# Patient Record
Sex: MALE | Race: WHITE | NOT HISPANIC OR LATINO | Employment: FULL TIME | URBAN - METROPOLITAN AREA
[De-identification: names, ages, dates, MRNs, and addresses within clinical notes are randomized per-mention and may not be internally consistent; named-entity substitution may affect disease eponyms.]

---

## 2017-09-22 ENCOUNTER — TRANSCRIBE ORDERS (OUTPATIENT)
Dept: ADMINISTRATIVE | Facility: HOSPITAL | Age: 47
End: 2017-09-22

## 2017-09-22 DIAGNOSIS — M25.662 DECREASED ROM OF LEFT KNEE: Primary | ICD-10-CM

## 2017-09-22 DIAGNOSIS — M25.362 INSTABILITY OF KNEE JOINT, LEFT: ICD-10-CM

## 2017-09-22 DIAGNOSIS — M25.562 LEFT KNEE PAIN, UNSPECIFIED CHRONICITY: ICD-10-CM

## 2017-09-28 ENCOUNTER — HOSPITAL ENCOUNTER (OUTPATIENT)
Dept: MRI IMAGING | Facility: HOSPITAL | Age: 47
Discharge: HOME | End: 2017-09-28
Payer: COMMERCIAL

## 2017-09-28 DIAGNOSIS — M25.362 INSTABILITY OF KNEE JOINT, LEFT: ICD-10-CM

## 2017-09-28 DIAGNOSIS — M25.662 DECREASED ROM OF LEFT KNEE: ICD-10-CM

## 2017-09-28 DIAGNOSIS — M25.562 LEFT KNEE PAIN, UNSPECIFIED CHRONICITY: ICD-10-CM

## 2017-09-28 PROCEDURE — 73721 MRI JNT OF LWR EXTRE W/O DYE: CPT

## 2018-03-20 DIAGNOSIS — Z00.00 WELL ADULT EXAM: Primary | ICD-10-CM

## 2018-03-29 ENCOUNTER — HOSPITAL ENCOUNTER (OUTPATIENT)
Dept: CT IMAGING | Facility: HOSPITAL | Age: 48
Discharge: HOME | End: 2018-03-29

## 2018-03-29 ENCOUNTER — HOSPITAL ENCOUNTER (OUTPATIENT)
Dept: ULTRASOUND IMAGING | Facility: HOSPITAL | Age: 48
Discharge: HOME | End: 2018-03-29

## 2018-03-29 ENCOUNTER — HOSPITAL ENCOUNTER (OUTPATIENT)
Dept: NON INVASIVE DIAGNOSTICS | Facility: CLINIC | Age: 48
Discharge: HOME | End: 2018-03-29

## 2018-03-29 ENCOUNTER — OFFICE VISIT (OUTPATIENT)
Dept: FAMILY MEDICINE CLINIC | Facility: CLINIC | Age: 48
End: 2018-03-29

## 2018-03-29 ENCOUNTER — APPOINTMENT (OUTPATIENT)
Dept: LAB | Facility: CLINIC | Age: 48
End: 2018-03-29

## 2018-03-29 VITALS
TEMPERATURE: 98.7 F | HEART RATE: 76 BPM | DIASTOLIC BLOOD PRESSURE: 82 MMHG | BODY MASS INDEX: 28.06 KG/M2 | RESPIRATION RATE: 12 BRPM | WEIGHT: 207.2 LBS | SYSTOLIC BLOOD PRESSURE: 132 MMHG | HEIGHT: 72 IN

## 2018-03-29 DIAGNOSIS — Z00.00 WELL ADULT EXAM: ICD-10-CM

## 2018-03-29 DIAGNOSIS — Z00.00 WELL ADULT EXAM: Primary | ICD-10-CM

## 2018-03-29 DIAGNOSIS — E55.9 VITAMIN D DEFICIENCY: ICD-10-CM

## 2018-03-29 DIAGNOSIS — R31.29 MICROSCOPIC HEMATURIA: ICD-10-CM

## 2018-03-29 DIAGNOSIS — E78.01 FAMILIAL HYPERCHOLESTEROLEMIA: ICD-10-CM

## 2018-03-29 DIAGNOSIS — R73.09 ELEVATED GLUCOSE: ICD-10-CM

## 2018-03-29 LAB
25(OH)D3 SERPL-MCNC: 14.6 NG/ML (ref 30–100)
ALBUMIN SERPL BCP-MCNC: 4.3 G/DL (ref 3.5–5)
ALP SERPL-CCNC: 47 U/L (ref 46–116)
ALT SERPL W P-5'-P-CCNC: 28 U/L (ref 12–78)
ANION GAP SERPL CALCULATED.3IONS-SCNC: 8 MMOL/L (ref 4–13)
AST SERPL W P-5'-P-CCNC: 14 U/L (ref 5–45)
ATRIAL RATE: 64 BPM
BACTERIA UR QL AUTO: ABNORMAL /HPF
BASOPHILS # BLD AUTO: 0.03 THOUSANDS/ΜL (ref 0–0.1)
BASOPHILS NFR BLD AUTO: 1 % (ref 0–1)
BILIRUB SERPL-MCNC: 0.8 MG/DL (ref 0.2–1)
BILIRUB UR QL STRIP: NEGATIVE
BUN SERPL-MCNC: 17 MG/DL (ref 5–25)
CALCIUM SERPL-MCNC: 8.7 MG/DL (ref 8.3–10.1)
CHEST PAIN STATEMENT: NORMAL
CHLORIDE SERPL-SCNC: 105 MMOL/L (ref 100–108)
CHOLEST SERPL-MCNC: 221 MG/DL (ref 50–200)
CLARITY UR: CLEAR
CO2 SERPL-SCNC: 30 MMOL/L (ref 21–32)
COLOR UR: YELLOW
CREAT SERPL-MCNC: 1.06 MG/DL (ref 0.6–1.3)
CRP SERPL HS-MCNC: 1.69 MG/L
EOSINOPHIL # BLD AUTO: 0.08 THOUSAND/ΜL (ref 0–0.61)
EOSINOPHIL NFR BLD AUTO: 2 % (ref 0–6)
ERYTHROCYTE [DISTWIDTH] IN BLOOD BY AUTOMATED COUNT: 13.1 % (ref 11.6–15.1)
EST. AVERAGE GLUCOSE BLD GHB EST-MCNC: 131 MG/DL
GFR SERPL CREATININE-BSD FRML MDRD: 84 ML/MIN/1.73SQ M
GLUCOSE P FAST SERPL-MCNC: 106 MG/DL (ref 65–99)
GLUCOSE UR STRIP-MCNC: NEGATIVE MG/DL
HBA1C MFR BLD: 6.2 % (ref 4.2–6.3)
HCT VFR BLD AUTO: 44.7 % (ref 36.5–49.3)
HDLC SERPL-MCNC: 39 MG/DL (ref 40–60)
HGB BLD-MCNC: 14.8 G/DL (ref 12–17)
HGB UR QL STRIP.AUTO: ABNORMAL
KETONES UR STRIP-MCNC: NEGATIVE MG/DL
LDLC SERPL CALC-MCNC: 152 MG/DL (ref 0–100)
LEUKOCYTE ESTERASE UR QL STRIP: NEGATIVE
LYMPHOCYTES # BLD AUTO: 2.05 THOUSANDS/ΜL (ref 0.6–4.47)
LYMPHOCYTES NFR BLD AUTO: 41 % (ref 14–44)
MAX DIASTOLIC BP: 94 MMHG
MAX HEART RATE: 179 BPM
MAX PREDICTED HEART RATE: 175 BPM
MAX. SYSTOLIC BP: 196 MMHG
MCH RBC QN AUTO: 29.2 PG (ref 26.8–34.3)
MCHC RBC AUTO-ENTMCNC: 33.1 G/DL (ref 31.4–37.4)
MCV RBC AUTO: 88 FL (ref 82–98)
MONOCYTES # BLD AUTO: 0.33 THOUSAND/ΜL (ref 0.17–1.22)
MONOCYTES NFR BLD AUTO: 7 % (ref 4–12)
MUCOUS THREADS UR QL AUTO: ABNORMAL
NEUTROPHILS # BLD AUTO: 2.56 THOUSANDS/ΜL (ref 1.85–7.62)
NEUTS SEG NFR BLD AUTO: 49 % (ref 43–75)
NITRITE UR QL STRIP: NEGATIVE
NON-SQ EPI CELLS URNS QL MICRO: ABNORMAL /HPF
P AXIS: 17 DEGREES
PH UR STRIP.AUTO: 5 [PH] (ref 4.5–8)
PLATELET # BLD AUTO: 222 THOUSANDS/UL (ref 149–390)
PMV BLD AUTO: 10.8 FL (ref 8.9–12.7)
POTASSIUM SERPL-SCNC: 4.4 MMOL/L (ref 3.5–5.3)
PR INTERVAL: 156 MS
PROT SERPL-MCNC: 7.2 G/DL (ref 6.4–8.2)
PROT UR STRIP-MCNC: NEGATIVE MG/DL
PROTOCOL NAME: NORMAL
PSA SERPL-MCNC: 0.9 NG/ML (ref 0–4)
QRS AXIS: 8 DEGREES
QRSD INTERVAL: 84 MS
QT INTERVAL: 408 MS
QTC INTERVAL: 420 MS
RBC # BLD AUTO: 5.06 MILLION/UL (ref 3.88–5.62)
RBC #/AREA URNS AUTO: ABNORMAL /HPF
REASON FOR TERMINATION: NORMAL
SODIUM SERPL-SCNC: 143 MMOL/L (ref 136–145)
SP GR UR STRIP.AUTO: >=1.03 (ref 1–1.03)
T WAVE AXIS: 8 DEGREES
TARGET HR FORMULA: NORMAL
TEST INDICATION: NORMAL
TIME IN EXERCISE PHASE: NORMAL
TRIGL SERPL-MCNC: 149 MG/DL
TSH SERPL DL<=0.05 MIU/L-ACNC: 1.16 UIU/ML (ref 0.36–3.74)
UROBILINOGEN UR QL STRIP.AUTO: 0.2 E.U./DL
VENTRICULAR RATE: 64 BPM
WBC # BLD AUTO: 5.05 THOUSAND/UL (ref 4.31–10.16)
WBC #/AREA URNS AUTO: ABNORMAL /HPF

## 2018-03-29 PROCEDURE — 93010 ELECTROCARDIOGRAM REPORT: CPT | Performed by: INTERNAL MEDICINE

## 2018-03-29 PROCEDURE — 93306 TTE W/DOPPLER COMPLETE: CPT | Performed by: INTERNAL MEDICINE

## 2018-03-29 PROCEDURE — 82306 VITAMIN D 25 HYDROXY: CPT

## 2018-03-29 PROCEDURE — 75571 CT HRT W/O DYE W/CA TEST: CPT

## 2018-03-29 PROCEDURE — 81001 URINALYSIS AUTO W/SCOPE: CPT

## 2018-03-29 PROCEDURE — 93350 STRESS TTE ONLY: CPT

## 2018-03-29 PROCEDURE — 36415 COLL VENOUS BLD VENIPUNCTURE: CPT

## 2018-03-29 PROCEDURE — 76700 US EXAM ABDOM COMPLETE: CPT

## 2018-03-29 PROCEDURE — 93979 VASCULAR STUDY: CPT | Performed by: SURGERY

## 2018-03-29 PROCEDURE — 93351 STRESS TTE COMPLETE: CPT | Performed by: INTERNAL MEDICINE

## 2018-03-29 PROCEDURE — 83036 HEMOGLOBIN GLYCOSYLATED A1C: CPT

## 2018-03-29 PROCEDURE — 80061 LIPID PANEL: CPT

## 2018-03-29 PROCEDURE — 84443 ASSAY THYROID STIM HORMONE: CPT

## 2018-03-29 PROCEDURE — 86141 C-REACTIVE PROTEIN HS: CPT

## 2018-03-29 PROCEDURE — 93306 TTE W/DOPPLER COMPLETE: CPT

## 2018-03-29 PROCEDURE — 93922 UPR/L XTREMITY ART 2 LEVELS: CPT | Performed by: SURGERY

## 2018-03-29 PROCEDURE — 84153 ASSAY OF PSA TOTAL: CPT

## 2018-03-29 PROCEDURE — 85025 COMPLETE CBC W/AUTO DIFF WBC: CPT

## 2018-03-29 PROCEDURE — 80053 COMPREHEN METABOLIC PANEL: CPT

## 2018-03-29 PROCEDURE — 93880 EXTRACRANIAL BILAT STUDY: CPT | Performed by: SURGERY

## 2018-03-29 PROCEDURE — 93005 ELECTROCARDIOGRAM TRACING: CPT

## 2018-03-29 PROCEDURE — 99499EX: Performed by: INTERNAL MEDICINE

## 2018-03-29 NOTE — PROGRESS NOTES
HEART AND VASCULAR SUMMARY      1  ECG: Normal Sinus Rhythm  Normal ECG  2  Echocardiogram: Normal LV function  No valvular disease    3  Stress ECHO: Normal ECG response to exercise  4  Coronary Calcium CT: 0      Impression and Recommendations:    No evidence of coronary artery disease  Recommend heart healthy lifestyle

## 2018-03-29 NOTE — PATIENT INSTRUCTIONS
Lynette Arellano, please take over-the-counter vitamin D3 2000 units per day for vitamin-D deficiency

## 2018-03-29 NOTE — PROGRESS NOTES
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, and softball as well  He is generally active  He had a recent problem with mild left knee pain after jumping off his truck at the beginning of this year, with MRI performed showing a bone chip  No treatment was required and he has no symptoms after some initial swelling  There have been no previous surgeries or serious illnesses  HPI his healthcare goals are to maintain exercise 3 to 4 times per week, which she currently performs without chest pain or shortness of breath or declining exercise tolerance  He would like to strengthen his core body muscles  He would also like to reduce his weight by about 10 lb  His [employer] concerns are his stress at work and counter acting this, and his family history which includes his father having high blood pressure and diabetes, and his mother surviving breast cancer 25 years ago  He also has 1 sibling with high blood pressure  He has had 1 previous executive physical and reports that this was unremarkable  He has no known history of elevated blood sugar, diabetes, diagnosis of hypertension or hyperlipidemia  Past medical history is as above and also history of Osgood-Schlatter's disease as a teenager and he did wear knee braces when playing sports  Family history:  Please see above  Social history:  He drinks alcohol socially, and infrequently smokes a cigar  He does exercise regularly  He is , and works full-time  There is no history of any drug use      Allergies:  No known allergies to medicines  Medications:  None  Review of Systems  as above, and as per the healthcare questionnaire, which is essentially negative, all others negative  Active Ambulatory Problems     Diagnosis Date Noted    No Active Ambulatory Problems     Resolved Ambulatory Problems     Diagnosis Date Noted    No Resolved Ambulatory Problems     No Additional Past Medical History       No past surgical history on file  No family history on file  Social History     Social History    Marital status: /Civil Union     Spouse name: N/A    Number of children: N/A    Years of education: N/A     Occupational History    Not on file  Social History Main Topics    Smoking status: Not on file    Smokeless tobacco: Not on file    Alcohol use Not on file    Drug use: Unknown    Sexual activity: Not on file     Other Topics Concern    Not on file     Social History Narrative    No narrative on file       No current outpatient prescriptions on file  Allergies not on file    No exam data present    Objective:     Physical Exam      Vital signs stable, with blood pressure 132/82, left arm sitting, pulse regular at 76, respirations unlabored at 12 and temperature is 98 7°  He is 6 feet tall and 207 2 lb  Visual acuity in the right eye is 20/30, and is 20/20 in the right eye without glasses or contact lenses  HEENT exam:  Unremarkable including grossly normal hearing and vision, with 3 mm pupils, with normal direct and consensual light responses, extraocular eye movements intact with normal funduscopic exam   There is no scleral icterus or conjunctival pallor  Dentition is normal without gum disease  Pharynx is normal   There is no head or neck mass or adenopathy      Neck:  Without trauma, full range of motion, supple, no JVD, no carotid bruits and carotid pulses are normal   Trachea midline without stridor and thyroid exam is normal     Chest:  No deformity, no trauma, no supraclavicular adenopathy  Lungs clear throughout, with normal expiratory phase as well  Cardiac:  Regular rate and rhythm, normal S1 and S2, no murmur, no S4 or S3, PMI fifth intercostal space midclavicular line  Back:  No CVA mass or tenderness, no kyphosis or scoliosis  There is full range of motion of the spine  Abdomen:  No surgical scars, no periumbilical adenopathy or hernia throughout, nondistended, normal bowel sounds, soft and nontender without masses bruits organomegaly  There is also no inguinal hernia, there are 2/2 femoral pulses and there is no inguinal adenopathy  Extremities: There is no edema clubbing or cyanosis  There are 2/2 radial, brachial, popliteal, posterior tibial and dorsalis pedis pulses bilaterally  Exam of feet is notable for a posttraumatic second left toenail with partial nail regrowth  Neurologic exam is normal, including normal cognitive status, normal affect  Joints notable for good joint function, with evidence of previous Osgood-Schlatter's disease  Skin:  Very little some related skin damage, with rare benign hemangiomas, rare slightly darkly pigmented nevi, slight hyper trick [place] of the trunk, no rash or skin malignancy is noted  Assessment/Plan:    There are no diagnoses linked to this encounter  Executive Physical Summary:      Sean Del Valle, please take Vitamin D3 2000 units per day  Please repeat your urinalysis in one month  Please recheck your HbA1c yearly and limit your carbohydrate intake and exercise regularly

## 2018-03-29 NOTE — PROGRESS NOTES
Fitness Summary and Recommendations:  Zan Munoz scored at the 25% on his body composition assessment with a bodyfat % of 24 7%  Zan Munoz scored in the average range for flexibility with a sit & reach score of 18 cm  His Muscle Strength/Endurance scores placed him at the 35% (lower body) and 90% (upper body) with a chair stand score of 22 and arm curl score of 30 respectfully  [facility] Cardiovascular Score of 79 1 placed him at the 95%  Overall Zan Munoz would be considered to have an above average fitness level with a 55% score  Continued emphasis on regular exercise and sound nutrition practices will enable Zan Munoz to maintain his optimal level of fitness

## 2018-03-29 NOTE — PROGRESS NOTES
Nutritional Summary and Recommendations:   Chely Ramirez presents for nutrition assessment and education  Discussion focused on restoration of Vit D levels to normal, improved body composition and HDL levels, and current sub optimal protein intake in relation to skipping meals and snacks  HT: 72", WT: 204 8 lb, BMI 27 8  Fat Mass 50 6 lb, Fat % 24 7%,  2 lb  Desired Fat Mass 43 lb  BMR 1959 kcal   Meds: reviewed  Labs: Vit D 14 6, Chol 221, HDL 39, , Trig 149, Glu 106  Nutrition Diagnoses: Overweight and abnormal nutrition related labs related to food and nutrition knowledge deficit as evidenced by body composition results, lab values and patient food recall  Goals:  1  Consume 2100 kcal, =/> 97 gram protein daily  2  Food journal with myfitnesspal   3  Achieve fat mass 43 lb within 12 months    4  Restore Vitamin D to >30 within 6 months with compliance of Dr  Prescribed Vitamin D3   5  Consume lean protein sources at all meals and 1-2 snacks daily to achieve 97 gram protein goal   6  Initiate consuming breakfast within 1 hour upon waking to include :       Lean protein(atleast 15 grms protein)  and        Complex carbohydrate(fresh fruit or a grain with >3grm fiber/serving)   Perceived Comprehension: Very good                   Expected Compliance: Very Good

## 2019-07-23 DIAGNOSIS — Z00.00 HEALTH CARE MAINTENANCE: Primary | ICD-10-CM

## 2019-07-23 PROCEDURE — 99499EX: Performed by: FAMILY MEDICINE

## 2019-08-06 ENCOUNTER — HOSPITAL ENCOUNTER (OUTPATIENT)
Dept: NON INVASIVE DIAGNOSTICS | Facility: CLINIC | Age: 49
Discharge: HOME | End: 2019-08-06

## 2019-08-06 ENCOUNTER — APPOINTMENT (OUTPATIENT)
Dept: LAB | Facility: CLINIC | Age: 49
End: 2019-08-06

## 2019-08-06 ENCOUNTER — OFFICE VISIT (OUTPATIENT)
Dept: FAMILY MEDICINE CLINIC | Facility: CLINIC | Age: 49
End: 2019-08-06

## 2019-08-06 ENCOUNTER — OFFICE VISIT (OUTPATIENT)
Dept: LAB | Facility: CLINIC | Age: 49
End: 2019-08-06

## 2019-08-06 VITALS
OXYGEN SATURATION: 100 % | RESPIRATION RATE: 10 BRPM | SYSTOLIC BLOOD PRESSURE: 122 MMHG | DIASTOLIC BLOOD PRESSURE: 88 MMHG | WEIGHT: 196.8 LBS | BODY MASS INDEX: 26.66 KG/M2 | HEIGHT: 72 IN | HEART RATE: 56 BPM | TEMPERATURE: 97.8 F

## 2019-08-06 DIAGNOSIS — Z00.00 HEALTH CARE MAINTENANCE: ICD-10-CM

## 2019-08-06 DIAGNOSIS — Z00.00 HEALTH CARE MAINTENANCE: Primary | ICD-10-CM

## 2019-08-06 DIAGNOSIS — R31.29 MICROSCOPIC HEMATURIA: ICD-10-CM

## 2019-08-06 DIAGNOSIS — E78.2 MIXED HYPERLIPIDEMIA: ICD-10-CM

## 2019-08-06 DIAGNOSIS — R73.09 ELEVATED GLUCOSE: ICD-10-CM

## 2019-08-06 PROBLEM — I10 ESSENTIAL HYPERTENSION: Status: ACTIVE | Noted: 2019-08-06

## 2019-08-06 LAB
25(OH)D3 SERPL-MCNC: 26.1 NG/ML (ref 30–100)
ALBUMIN SERPL BCP-MCNC: 4.3 G/DL (ref 3.5–5)
ALP SERPL-CCNC: 54 U/L (ref 46–116)
ALT SERPL W P-5'-P-CCNC: 29 U/L (ref 12–78)
ANION GAP SERPL CALCULATED.3IONS-SCNC: 8 MMOL/L (ref 4–13)
AST SERPL W P-5'-P-CCNC: 14 U/L (ref 5–45)
ATRIAL RATE: 64 BPM
BACTERIA UR QL AUTO: ABNORMAL /HPF
BASOPHILS # BLD AUTO: 0.03 THOUSANDS/ΜL (ref 0–0.1)
BASOPHILS NFR BLD AUTO: 1 % (ref 0–1)
BILIRUB SERPL-MCNC: 1 MG/DL (ref 0.2–1)
BILIRUB UR QL STRIP: NEGATIVE
BUN SERPL-MCNC: 22 MG/DL (ref 5–25)
CALCIUM SERPL-MCNC: 8.9 MG/DL (ref 8.3–10.1)
CHEST PAIN STATEMENT: NORMAL
CHLORIDE SERPL-SCNC: 103 MMOL/L (ref 100–108)
CHOLEST SERPL-MCNC: 235 MG/DL (ref 50–200)
CLARITY UR: CLEAR
CO2 SERPL-SCNC: 29 MMOL/L (ref 21–32)
COLOR UR: YELLOW
CREAT SERPL-MCNC: 1.17 MG/DL (ref 0.6–1.3)
CRP SERPL HS-MCNC: 3.04 MG/L
EOSINOPHIL # BLD AUTO: 0.06 THOUSAND/ΜL (ref 0–0.61)
EOSINOPHIL NFR BLD AUTO: 1 % (ref 0–6)
ERYTHROCYTE [DISTWIDTH] IN BLOOD BY AUTOMATED COUNT: 13.1 % (ref 11.6–15.1)
EST. AVERAGE GLUCOSE BLD GHB EST-MCNC: 117 MG/DL
GFR SERPL CREATININE-BSD FRML MDRD: 74 ML/MIN/1.73SQ M
GLUCOSE P FAST SERPL-MCNC: 96 MG/DL (ref 65–99)
GLUCOSE UR STRIP-MCNC: NEGATIVE MG/DL
HBA1C MFR BLD: 5.7 % (ref 4.2–6.3)
HCT VFR BLD AUTO: 47.1 % (ref 36.5–49.3)
HDLC SERPL-MCNC: 42 MG/DL (ref 40–60)
HGB BLD-MCNC: 15.3 G/DL (ref 12–17)
HGB UR QL STRIP.AUTO: ABNORMAL
IMM GRANULOCYTES # BLD AUTO: 0.01 THOUSAND/UL (ref 0–0.2)
IMM GRANULOCYTES NFR BLD AUTO: 0 % (ref 0–2)
KETONES UR STRIP-MCNC: NEGATIVE MG/DL
LDLC SERPL CALC-MCNC: 164 MG/DL (ref 0–100)
LEUKOCYTE ESTERASE UR QL STRIP: NEGATIVE
LYMPHOCYTES # BLD AUTO: 1.69 THOUSANDS/ΜL (ref 0.6–4.47)
LYMPHOCYTES NFR BLD AUTO: 34 % (ref 14–44)
MAX DIASTOLIC BP: 90 MMHG
MAX HEART RATE: 171 BPM
MAX PREDICTED HEART RATE: 174 BPM
MAX. SYSTOLIC BP: 198 MMHG
MCH RBC QN AUTO: 30.4 PG (ref 26.8–34.3)
MCHC RBC AUTO-ENTMCNC: 32.5 G/DL (ref 31.4–37.4)
MCV RBC AUTO: 94 FL (ref 82–98)
MONOCYTES # BLD AUTO: 0.48 THOUSAND/ΜL (ref 0.17–1.22)
MONOCYTES NFR BLD AUTO: 10 % (ref 4–12)
NEUTROPHILS # BLD AUTO: 2.77 THOUSANDS/ΜL (ref 1.85–7.62)
NEUTS SEG NFR BLD AUTO: 54 % (ref 43–75)
NITRITE UR QL STRIP: NEGATIVE
NON-SQ EPI CELLS URNS QL MICRO: ABNORMAL /HPF
NRBC BLD AUTO-RTO: 0 /100 WBCS
P AXIS: 16 DEGREES
PH UR STRIP.AUTO: 5.5 [PH]
PLATELET # BLD AUTO: 193 THOUSANDS/UL (ref 149–390)
PMV BLD AUTO: 10.9 FL (ref 8.9–12.7)
POTASSIUM SERPL-SCNC: 4.6 MMOL/L (ref 3.5–5.3)
PR INTERVAL: 156 MS
PROT SERPL-MCNC: 7.6 G/DL (ref 6.4–8.2)
PROT UR STRIP-MCNC: NEGATIVE MG/DL
PROTOCOL NAME: NORMAL
PSA SERPL-MCNC: 0.9 NG/ML (ref 0–4)
QRS AXIS: 16 DEGREES
QRSD INTERVAL: 86 MS
QT INTERVAL: 430 MS
QTC INTERVAL: 443 MS
RBC # BLD AUTO: 5.04 MILLION/UL (ref 3.88–5.62)
RBC #/AREA URNS AUTO: ABNORMAL /HPF
REASON FOR TERMINATION: NORMAL
SODIUM SERPL-SCNC: 140 MMOL/L (ref 136–145)
SP GR UR STRIP.AUTO: 1.01 (ref 1–1.03)
T WAVE AXIS: 13 DEGREES
TARGET HR FORMULA: NORMAL
TEST INDICATION: NORMAL
TIME IN EXERCISE PHASE: NORMAL
TRIGL SERPL-MCNC: 145 MG/DL
TSH SERPL DL<=0.05 MIU/L-ACNC: 1.74 UIU/ML (ref 0.36–3.74)
UROBILINOGEN UR QL STRIP.AUTO: 0.2 E.U./DL
VENTRICULAR RATE: 64 BPM
WBC # BLD AUTO: 5.04 THOUSAND/UL (ref 4.31–10.16)
WBC #/AREA URNS AUTO: ABNORMAL /HPF

## 2019-08-06 PROCEDURE — 83036 HEMOGLOBIN GLYCOSYLATED A1C: CPT

## 2019-08-06 PROCEDURE — 93005 ELECTROCARDIOGRAM TRACING: CPT

## 2019-08-06 PROCEDURE — 36415 COLL VENOUS BLD VENIPUNCTURE: CPT

## 2019-08-06 PROCEDURE — 93306 TTE W/DOPPLER COMPLETE: CPT

## 2019-08-06 PROCEDURE — 99499EX: Performed by: FAMILY MEDICINE

## 2019-08-06 PROCEDURE — 80061 LIPID PANEL: CPT

## 2019-08-06 PROCEDURE — 93306 TTE W/DOPPLER COMPLETE: CPT | Performed by: INTERNAL MEDICINE

## 2019-08-06 PROCEDURE — 84153 ASSAY OF PSA TOTAL: CPT

## 2019-08-06 PROCEDURE — 93351 STRESS TTE COMPLETE: CPT | Performed by: INTERNAL MEDICINE

## 2019-08-06 PROCEDURE — 82306 VITAMIN D 25 HYDROXY: CPT

## 2019-08-06 PROCEDURE — 80053 COMPREHEN METABOLIC PANEL: CPT

## 2019-08-06 PROCEDURE — 81001 URINALYSIS AUTO W/SCOPE: CPT

## 2019-08-06 PROCEDURE — 93350 STRESS TTE ONLY: CPT

## 2019-08-06 PROCEDURE — 86141 C-REACTIVE PROTEIN HS: CPT

## 2019-08-06 PROCEDURE — 85025 COMPLETE CBC W/AUTO DIFF WBC: CPT

## 2019-08-06 PROCEDURE — 84443 ASSAY THYROID STIM HORMONE: CPT

## 2019-08-06 NOTE — PROGRESS NOTES
Heart and Vascular Summary:     1  Baseline EKG:   Sinus rhythm with sinus arrhythmia  You have a normal heart rate and there is normal variation in your heart rate based on your respirations  2  Echocardiogram:  Normal right and left ventricular size and function  Left ventricular ejection fraction (EF) was 60%  There were no significant valve problems  Right side of the heart had good measurements and normal function  3  Stress Echocardiogram: Good exercise capacity (14 mins and 32 secs), achieving 17 2 METS, and 98% of maximal predicted heart rate  You had no significant changes during stress that suggest any ischemia or blockages over 50%  Blood pressure was normal at the start of the test, with a normal response to exercise (peak blood pressure of 295 systolic)  You had a good heart rate and blood pressure recovery after exercise  Based on the Duke Treadmill score, there is a low risk for cardiac events  Normal baseline left ventricular wall motion and function on echocardiogram, with no significant wall motion abnormalities or reduction in function with peak exercise  This confirms that there are no functionally significant blockages over 50% in the coronary arteries at this time

## 2019-08-06 NOTE — PROGRESS NOTES
[facility] Physical Exam Half Day        Subjective:     Patient ID: Ana Ramírez is a 57 y o  male  Patient presents for an Executive Physical Exam  The patient reports problems - occaisional knee pain  HPI   It was our pleasure to host Geovanni Enriquez for an [facility] half day physical   Maykel Rae is in good health and his only complaint is of knee pain that he has had for several years  Review of Systems   Constitutional: Negative for chills, fatigue and fever  HENT: Negative for congestion, ear pain, hearing loss, postnasal drip, rhinorrhea and sore throat  Eyes: Negative for pain and visual disturbance  Respiratory: Negative for chest tightness, shortness of breath and wheezing  Cardiovascular: Negative for chest pain and leg swelling  Gastrointestinal: Negative for abdominal distention, abdominal pain, constipation, diarrhea and vomiting  Endocrine: Negative for cold intolerance and heat intolerance  Genitourinary: Negative for difficulty urinating, frequency and urgency  Musculoskeletal: Positive for arthralgias (bilateral knee pain)  Negative for gait problem  Skin: Negative for color change  Neurological: Negative for dizziness, tremors, syncope, numbness and headaches  Hematological: Negative for adenopathy  Psychiatric/Behavioral: Negative for agitation, confusion and sleep disturbance  The patient is not nervous/anxious  Active Ambulatory Problems     Diagnosis Date Noted    Vitamin D deficiency 03/29/2018    Mixed hyperlipidemia 03/29/2018    Microscopic hematuria 03/29/2018    Elevated glucose 03/29/2018    Essential hypertension 08/06/2019     Resolved Ambulatory Problems     Diagnosis Date Noted    No Resolved Ambulatory Problems     No Additional Past Medical History       No past surgical history on file      Family History   Problem Relation Age of Onset   [facility] Breast cancer Mother     Diabetes type II Father     Hypertension Father        Social History     Socioeconomic History    Marital status: /Civil Union     Spouse name: Not on file    Number of children: Not on file    Years of education: Not on file    Highest education level: Not on file   Occupational History    Not on file   Social Needs    Financial resource strain: Not on file    Food insecurity:     Worry: Not on file     Inability: Not on file    Transportation needs:     Medical: Not on file     Non-medical: Not on file   Tobacco Use    Smoking status: Light Tobacco Smoker     Types: Cigars    Smokeless tobacco: Never Used   Substance and Sexual Activity    Alcohol use: Yes     Alcohol/week: 2 0 standard drinks     Types: 2 Cans of beer per week    Drug use: No    Sexual activity: Yes     Partners: Female   Lifestyle    Physical activity:     Days per week: Not on file     Minutes per session: Not on file    Stress: Not on file   Relationships    Social connections:     Talks on phone: Not on file     Gets together: Not on file     Attends Confucianism service: Not on file     Active member of club or organization: Not on file     Attends meetings of clubs or organizations: Not on file     Relationship status: Not on file    Intimate partner violence:     Fear of current or ex partner: Not on file     Emotionally abused: Not on file     Physically abused: Not on file     Forced sexual activity: Not on file   Other Topics Concern    Not on file   Social History Narrative    Not on file       No current outpatient medications on file  No Known Allergies      Objective:     Physical Exam   Constitutional: He is oriented to person, place, and time  He appears well-developed and well-nourished  HENT:   Head: Normocephalic  Mouth/Throat: Oropharynx is clear and moist    Eyes: Conjunctivae are normal  No scleral icterus  Neck: Normal range of motion  No thyromegaly present  Cardiovascular: Normal rate, regular rhythm and normal heart sounds     No murmur heard   Pulmonary/Chest: Effort normal and breath sounds normal  No respiratory distress  He has no wheezes  Abdominal: Soft  Bowel sounds are normal  He exhibits no distension  There is no tenderness  Musculoskeletal: Normal range of motion  He exhibits no edema or tenderness  Lymphadenopathy:     He has no cervical adenopathy  Neurological: He is alert and oriented to person, place, and time  No cranial nerve deficit  Skin: Skin is warm and dry  No rash noted  No pallor  Psychiatric: He has a normal mood and affect  His behavior is normal    Nursing note and vitals reviewed  Vitals:    08/06/19 0930   BP: 122/88   BP Location: Left arm   Patient Position: Sitting   Pulse: 56   Resp: (!) 10   Temp: 97 8 °F (36 6 °C)   TempSrc: Tympanic   SpO2: 100%   Weight: 89 3 kg (196 lb 12 8 oz)   Height: 6' (1 829 m)       Assessment/Plan:    Diagnoses and all orders for this visit:    Health care maintenance    Elevated glucose    Mixed hyperlipidemia    Microscopic hematuria           Executive Physical Summary: It was our pleasure to host Mando Lopez for an [facility] half day physical   The following are the summary of today's findings:    Elevated blood sugar is improved, would follow nutrition's recommendations from today's visit to improve this  Elevated cholesterol, this is higher than last year, I recommend a low fat low cholesterol diet  Follow-up blood work will need to be done and would recommend establishing with a primary care provider to arrange follow-up  Hypertension, would recommend weight loss and dietary modifications to improve this  Check you blood pressure at home 2-3 times a month and report these readings to your primary care provider at a follow-up visit  Microscopic hematuria, would recommend urological follow-up as this is the second time this has been seen on your urinalysis

## 2019-08-06 NOTE — PROGRESS NOTES
Nutritional Summary and Recommendations:   Joanie Vincentbhavani, prefers to be called Akosua Calderon, presents for nutrition assessment follow up  Discussion focused on improved but continued depleted Vitamin D3 stores, improved body fat mass and total body weight, improved elevated HgBA1C to normal range, however needs to initiate meal planning practices in relation to continued skipping meals and snacks  Perceived barriers discussed regarding going long periods of time without fueling body  Akosua Calderon reports less consumption of high fat foods and increased intake of fruits and vegetables the past year  HT: 72", WT: 195 0lb, BMI 26 4, Tanita BMR 1891 kcal, Fat Mass 43 6lb, Fat % 22 4,  4lb  Meds: reviewed  Labs: Vit D 26 1, Chol 235, Trig 145, HDL 42, , HgBA1C 5 7, Glu 96  Goals:  1  Focus on meal planning by prepping at night for next day-prepare lean protein/high fiber foods/drinks to grab and go and pack a lunch as well with fruits and vegetables  2  Achieve 35-40 lb fat mass as next body composition goal within 12 months  3  Be mindful to use food journal myfitnesspal especially when reverting back to unhealthy food behaviors  4  Take 2000 IU Vitamin D3 daily to get levels to normal and sustain     5  Contact RD with any questions or concerns  Perceived Comprehension: Very Good      Expected Compliance: Good

## 2019-08-06 NOTE — PROGRESS NOTES
Fitness Summary and Recommendations:  Irma Brumfield scored at the 40% on his body composition assessment with a bodyfat % of 22 4%  Irma Brumfield scored in the average range for flexibility with a sit & reach score of 23 cm  His Muscle Strength/Endurance scores placed him at the 50% (lower body) and 95% (upper body) with a chair stand score of 22 and arm curl score of 30 respectively  Donaldos Cardiovascular Score of 60 3 placed him at the 95%  Overall Irma Brumfield would be considered to have an above average fitness level with a 67% score  His overall fitness score has increased 12% from his previous test on 03/29/18  Continued re-emphasis on regular exercise and sound nutrition practices will enable Irma Brumfield to reach his optimal level of fitness

## 2020-09-20 DIAGNOSIS — Z00.00 WELL ADULT EXAM: Primary | ICD-10-CM

## 2020-09-24 ENCOUNTER — LAB (OUTPATIENT)
Dept: LAB | Facility: CLINIC | Age: 50
End: 2020-09-24

## 2020-09-24 ENCOUNTER — TRANSCRIBE ORDERS (OUTPATIENT)
Dept: LAB | Facility: CLINIC | Age: 50
End: 2020-09-24

## 2020-09-24 ENCOUNTER — HOSPITAL ENCOUNTER (OUTPATIENT)
Dept: ULTRASOUND IMAGING | Facility: HOSPITAL | Age: 50
Discharge: HOME | End: 2020-09-24

## 2020-09-24 ENCOUNTER — OFFICE VISIT (OUTPATIENT)
Dept: FAMILY MEDICINE CLINIC | Facility: CLINIC | Age: 50
End: 2020-09-24

## 2020-09-24 ENCOUNTER — HOSPITAL ENCOUNTER (OUTPATIENT)
Dept: NON INVASIVE DIAGNOSTICS | Facility: CLINIC | Age: 50
Discharge: HOME | End: 2020-09-24

## 2020-09-24 VITALS
TEMPERATURE: 95.5 F | SYSTOLIC BLOOD PRESSURE: 130 MMHG | HEIGHT: 72 IN | HEART RATE: 72 BPM | OXYGEN SATURATION: 100 % | BODY MASS INDEX: 27.14 KG/M2 | RESPIRATION RATE: 12 BRPM | DIASTOLIC BLOOD PRESSURE: 80 MMHG | WEIGHT: 200.4 LBS

## 2020-09-24 DIAGNOSIS — Z00.00 WELL ADULT EXAM: ICD-10-CM

## 2020-09-24 DIAGNOSIS — R73.09 ELEVATED GLUCOSE: ICD-10-CM

## 2020-09-24 DIAGNOSIS — E78.2 MIXED HYPERLIPIDEMIA: Primary | ICD-10-CM

## 2020-09-24 DIAGNOSIS — R31.29 MICROSCOPIC HEMATURIA: ICD-10-CM

## 2020-09-24 DIAGNOSIS — E55.9 VITAMIN D DEFICIENCY: ICD-10-CM

## 2020-09-24 LAB
25(OH)D3 SERPL-MCNC: 30.6 NG/ML (ref 30–100)
ALBUMIN SERPL BCP-MCNC: 4.3 G/DL (ref 3.5–5)
ALP SERPL-CCNC: 49 U/L (ref 46–116)
ALT SERPL W P-5'-P-CCNC: 24 U/L (ref 12–78)
ANION GAP SERPL CALCULATED.3IONS-SCNC: 8 MMOL/L (ref 4–13)
AST SERPL W P-5'-P-CCNC: 10 U/L (ref 5–45)
ATRIAL RATE: 63 BPM
BACTERIA UR QL AUTO: ABNORMAL /HPF
BASOPHILS # BLD AUTO: 0.03 THOUSANDS/ΜL (ref 0–0.1)
BASOPHILS NFR BLD AUTO: 1 % (ref 0–1)
BILIRUB SERPL-MCNC: 1.1 MG/DL (ref 0.2–1)
BILIRUB UR QL STRIP: NEGATIVE
BUN SERPL-MCNC: 18 MG/DL (ref 5–25)
CALCIUM SERPL-MCNC: 8.8 MG/DL (ref 8.3–10.1)
CHLORIDE SERPL-SCNC: 104 MMOL/L (ref 100–108)
CHOLEST SERPL-MCNC: 226 MG/DL (ref 50–200)
CLARITY UR: CLEAR
CO2 SERPL-SCNC: 28 MMOL/L (ref 21–32)
COLOR UR: YELLOW
CREAT SERPL-MCNC: 1.22 MG/DL (ref 0.6–1.3)
CRP SERPL HS-MCNC: 1.41 MG/L
EOSINOPHIL # BLD AUTO: 0.08 THOUSAND/ΜL (ref 0–0.61)
EOSINOPHIL NFR BLD AUTO: 2 % (ref 0–6)
ERYTHROCYTE [DISTWIDTH] IN BLOOD BY AUTOMATED COUNT: 13.1 % (ref 11.6–15.1)
EST. AVERAGE GLUCOSE BLD GHB EST-MCNC: 126 MG/DL
GFR SERPL CREATININE-BSD FRML MDRD: 70 ML/MIN/1.73SQ M
GLUCOSE P FAST SERPL-MCNC: 98 MG/DL (ref 65–99)
GLUCOSE UR STRIP-MCNC: NEGATIVE MG/DL
HBA1C MFR BLD: 6 %
HCT VFR BLD AUTO: 48.2 % (ref 36.5–49.3)
HDLC SERPL-MCNC: 46 MG/DL
HGB BLD-MCNC: 15.7 G/DL (ref 12–17)
HGB UR QL STRIP.AUTO: ABNORMAL
IMM GRANULOCYTES # BLD AUTO: 0.01 THOUSAND/UL (ref 0–0.2)
IMM GRANULOCYTES NFR BLD AUTO: 0 % (ref 0–2)
KETONES UR STRIP-MCNC: NEGATIVE MG/DL
LDLC SERPL CALC-MCNC: 154 MG/DL (ref 0–100)
LEUKOCYTE ESTERASE UR QL STRIP: NEGATIVE
LYMPHOCYTES # BLD AUTO: 1.83 THOUSANDS/ΜL (ref 0.6–4.47)
LYMPHOCYTES NFR BLD AUTO: 38 % (ref 14–44)
MCH RBC QN AUTO: 30 PG (ref 26.8–34.3)
MCHC RBC AUTO-ENTMCNC: 32.6 G/DL (ref 31.4–37.4)
MCV RBC AUTO: 92 FL (ref 82–98)
MONOCYTES # BLD AUTO: 0.36 THOUSAND/ΜL (ref 0.17–1.22)
MONOCYTES NFR BLD AUTO: 8 % (ref 4–12)
NEUTROPHILS # BLD AUTO: 2.49 THOUSANDS/ΜL (ref 1.85–7.62)
NEUTS SEG NFR BLD AUTO: 51 % (ref 43–75)
NITRITE UR QL STRIP: NEGATIVE
NON-SQ EPI CELLS URNS QL MICRO: ABNORMAL /HPF
NRBC BLD AUTO-RTO: 0 /100 WBCS
P AXIS: 33 DEGREES
PH UR STRIP.AUTO: 5.5 [PH]
PLATELET # BLD AUTO: 223 THOUSANDS/UL (ref 149–390)
PMV BLD AUTO: 10.7 FL (ref 8.9–12.7)
POTASSIUM SERPL-SCNC: 4.2 MMOL/L (ref 3.5–5.3)
PR INTERVAL: 172 MS
PROT SERPL-MCNC: 7.7 G/DL (ref 6.4–8.2)
PROT UR STRIP-MCNC: NEGATIVE MG/DL
PSA SERPL-MCNC: 1.1 NG/ML (ref 0–4)
QRS AXIS: 12 DEGREES
QRSD INTERVAL: 84 MS
QT INTERVAL: 430 MS
QTC INTERVAL: 440 MS
RBC # BLD AUTO: 5.23 MILLION/UL (ref 3.88–5.62)
RBC #/AREA URNS AUTO: ABNORMAL /HPF
SODIUM SERPL-SCNC: 140 MMOL/L (ref 136–145)
SP GR UR STRIP.AUTO: >=1.03 (ref 1–1.03)
T WAVE AXIS: 17 DEGREES
TRIGL SERPL-MCNC: 129 MG/DL
TSH SERPL DL<=0.05 MIU/L-ACNC: 1.42 UIU/ML (ref 0.36–3.74)
UROBILINOGEN UR QL STRIP.AUTO: 0.2 E.U./DL
VENTRICULAR RATE: 63 BPM
WBC # BLD AUTO: 4.8 THOUSAND/UL (ref 4.31–10.16)
WBC #/AREA URNS AUTO: ABNORMAL /HPF

## 2020-09-24 PROCEDURE — 93351 STRESS TTE COMPLETE: CPT | Performed by: INTERNAL MEDICINE

## 2020-09-24 PROCEDURE — 93010 ELECTROCARDIOGRAM REPORT: CPT | Performed by: INTERNAL MEDICINE

## 2020-09-24 PROCEDURE — 82306 VITAMIN D 25 HYDROXY: CPT

## 2020-09-24 PROCEDURE — 81001 URINALYSIS AUTO W/SCOPE: CPT

## 2020-09-24 PROCEDURE — 99499EX: Performed by: FAMILY MEDICINE

## 2020-09-24 PROCEDURE — 93306 TTE W/DOPPLER COMPLETE: CPT | Performed by: INTERNAL MEDICINE

## 2020-09-24 PROCEDURE — 93306 TTE W/DOPPLER COMPLETE: CPT

## 2020-09-24 PROCEDURE — 93350 STRESS TTE ONLY: CPT

## 2020-09-24 PROCEDURE — 93922 UPR/L XTREMITY ART 2 LEVELS: CPT

## 2020-09-24 PROCEDURE — 83036 HEMOGLOBIN GLYCOSYLATED A1C: CPT

## 2020-09-24 PROCEDURE — 84443 ASSAY THYROID STIM HORMONE: CPT

## 2020-09-24 PROCEDURE — 36415 COLL VENOUS BLD VENIPUNCTURE: CPT

## 2020-09-24 PROCEDURE — 80061 LIPID PANEL: CPT

## 2020-09-24 PROCEDURE — 93005 ELECTROCARDIOGRAM TRACING: CPT

## 2020-09-24 PROCEDURE — VASC: Performed by: SURGERY

## 2020-09-24 PROCEDURE — 80053 COMPREHEN METABOLIC PANEL: CPT

## 2020-09-24 PROCEDURE — 86141 C-REACTIVE PROTEIN HS: CPT

## 2020-09-24 PROCEDURE — 76700 US EXAM ABDOM COMPLETE: CPT

## 2020-09-24 PROCEDURE — 85025 COMPLETE CBC W/AUTO DIFF WBC: CPT

## 2020-09-24 PROCEDURE — 84153 ASSAY OF PSA TOTAL: CPT

## 2020-09-24 NOTE — PROGRESS NOTES
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   He is feeling well today  He has no new health concerns  His only ongoing issue is bilateral knee pain which is chronic  He is on no  prescription or over-the-counter medications  He has not established with a primary care physician  Review of Systems   Constitutional: Negative  HENT: Negative  Negative for congestion, ear pain, hearing loss, nosebleeds, sore throat and trouble swallowing  Eyes: Negative  Respiratory: Negative for apnea, cough, chest tightness, shortness of breath and wheezing  Cardiovascular: Negative  Gastrointestinal: Negative for abdominal pain, blood in stool, constipation, diarrhea, nausea and vomiting  Endocrine: Negative  Genitourinary: Negative for difficulty urinating, dysuria, frequency, hematuria and urgency  Musculoskeletal: Negative for arthralgias (B/L knees), joint swelling and myalgias  Skin: Negative for rash  Neurological: Negative for dizziness, syncope, light-headedness, numbness and headaches  Hematological: Negative  Psychiatric/Behavioral: Negative for confusion, dysphoric mood and sleep disturbance  The patient is not nervous/anxious  Active Ambulatory Problems     Diagnosis Date Noted    Vitamin D deficiency 03/29/2018    Mixed hyperlipidemia 03/29/2018    Microscopic hematuria 03/29/2018    Elevated glucose 03/29/2018    Essential hypertension 08/06/2019     Resolved Ambulatory Problems     Diagnosis Date Noted    No Resolved Ambulatory Problems     No Additional Past Medical History       History reviewed  No pertinent surgical history      Family History   Problem Relation Age of Onset   [facility] Breast cancer Mother     Diabetes type II Father     Hypertension Father        Social History     Tobacco Use Smoking Status Light Tobacco Smoker    Types: Cigars   Smokeless Tobacco Never Used       No current outpatient medications on file  No Known Allergies      Objective:    Vitals:    09/24/20 0758   BP: 130/80   BP Location: Left arm   Patient Position: Sitting   Cuff Size: Standard   Pulse: 72   Resp: 12   Temp: (!) 95 5 °F (35 3 °C)   TempSrc: Tympanic Core   SpO2: 100%   Weight: 90 9 kg (200 lb 6 4 oz)   Height: 5' 11 5" (1 816 m)        Physical Exam  Vitals signs and nursing note reviewed  Constitutional:       Appearance: Normal appearance  He is well-developed  HENT:      Head: Normocephalic and atraumatic  Right Ear: Hearing, tympanic membrane and external ear normal       Left Ear: Hearing, tympanic membrane, ear canal and external ear normal       Nose: Nose normal  No nasal deformity or rhinorrhea  Right Sinus: No maxillary sinus tenderness or frontal sinus tenderness  Left Sinus: No maxillary sinus tenderness or frontal sinus tenderness  Mouth/Throat:      Mouth: No oral lesions  Dentition: Normal dentition  Pharynx: Uvula midline  No oropharyngeal exudate or posterior oropharyngeal erythema  Eyes:      General: Lids are normal       Conjunctiva/sclera: Conjunctivae normal       Pupils: Pupils are equal, round, and reactive to light  Neck:      Musculoskeletal: Normal range of motion and neck supple  Thyroid: No thyroid mass or thyromegaly  Vascular: No carotid bruit or JVD  Trachea: Trachea normal    Cardiovascular:      Rate and Rhythm: Normal rate and regular rhythm  Pulses: Normal pulses  Carotid pulses are 2+ on the right side and 2+ on the left side  Radial pulses are 2+ on the right side and 2+ on the left side  Heart sounds: No murmur  Pulmonary:      Effort: No accessory muscle usage or respiratory distress  Breath sounds: Normal breath sounds     Abdominal:      General: Bowel sounds are normal  There is no distension  Palpations: Abdomen is soft  There is no mass  Tenderness: There is no abdominal tenderness  Hernia: No hernia is present  Musculoskeletal: Normal range of motion  General: No tenderness or deformity  Lymphadenopathy:      Cervical: No cervical adenopathy  Skin:     General: Skin is warm and dry  Capillary Refill: Capillary refill takes less than 2 seconds  Findings: No erythema, lesion or rash  Neurological:      General: No focal deficit present  Mental Status: He is alert and oriented to person, place, and time  Cranial Nerves: No cranial nerve deficit  Sensory: No sensory deficit  Coordination: Coordination normal       Deep Tendon Reflexes: Reflexes are normal and symmetric  Psychiatric:         Speech: Speech normal          Behavior: Behavior normal          Thought Content: Thought content normal          Judgment: Judgment normal              Assessment/Plan:     1  Mixed hyperlipidemia    2  Microscopic hematuria  -     Ambulatory referral to Urology; Future    3  Vitamin D deficiency    4  Elevated glucose         Executive Physical Summary:      1  Hyperlipidemia  Total cholesterol is elevated at 226  This is decreased from last year  LDL or bad cholesterol is 154  Should be less than 130  Would consider treatment of this with a statin medication  2  Microscopic hematuria  Microscopic amounts of blood have been seen on urinalysis add this and previous her physicals  Very possible that this is idiopathic or harmless but should be investigated further  Would recommend urology referral      3  Vitamin-D deficiency  Vitamin-D level 30 1  Should be greater than 30  Recommend at least 2000 units of vitamin-D daily  4  Elevated glucose  Hemoglobin A1c level is 6 0 which does put you in the lower end of the borderline range for pre-diabetes    This has gone up from 1 year ago when it was 5 7 but overall still improved from 2 years ago when it was 6 2  Continued to attention to diet and regular exercise should help to achieve better blood sugar control

## 2020-09-24 NOTE — PROGRESS NOTES
Fitness Summary and Recommendations:  Shaylee Serna scored at the 30% on his body composition assessment with a bodyfat % of 24 5%  Shaylee Serna scored in the average range for flexibility with a sit & reach score of 22 cm  His Muscle Strength/Endurance scores placed him at the 55% (lower body) and 90% (upper body) with a chair stand score of 23 and arm curl score of 29 respectively  Donaldos Cardiovascular Score of 48 7 placed him at the 90%  Overall Shaylee Serna would be considered to have an above average fitness level with a 63% score  His overall fitness score has decreased by 4% from his previous test on 08/06/19  Continued emphasis on regular exercise and sound nutrition practices will enable Shaylee Serna to reach his optimal level of fitness

## 2020-09-24 NOTE — PROGRESS NOTES
Nutritional Summary and Recommendations:   Adarsh Cain presents today for follow up nutrition assessment and counseling  Reviewed previous nutrition plans and goals from prior encounters  Discussion focused on improving HgbA1c levels, body fat mass, LDL levels,maintenance of Vitamin D levels and overcoming barriers to adopting a healthy lifestyle with exercise and nutrition  HT: 72 in, WT: 198 6lb, BMI 26 9, [place] BMR 1906 kcal, Fat Mass 48 6lb,  0lb, Desired Fat Mass 35-40 lb  Labs: Vit D 30 6, Chol 226, Trig 129, HDL 46, , HgbA1C 6 0,   Meds: reviewed  Nutrition Diagnoses:      Overweight related to excess energy intake as evidenced by BMI and body fat mass  Abnormal nutrition related lab value related to physiological events as evidenced by LDL, HgbA1c  Goals:  1  Achieve body fat mass 35-40 lb within 12 months  2  Consume 2-3 serving fresh/frozen fruit daily , 4-6 Cup fresh/frozen vegetables daily for inclusion of more plant based eating  3  Achieve LDL<154 by next encounter and HgbA1C < 6 0 by next encounter  4  Plan ahead for improving time management on busy nights with family to improve a healthy balanced dinner as discussed with RD   5  Read food labels for Dietary Fiber > or=3grams/serving  6  Take 2000 IU Vitamin D3 in Spring/Summer months and consider 4000 IU in Fall/Winter months    7  Contact RD with any questions/concerns  Perceived Comprehension: Very Good                                   Expected Compliance: Good

## 2020-09-24 NOTE — PROGRESS NOTES
HEART AND VASCULAR SUMMARY    1  Lipids: Total 226, , HDL 46,     2  ECG: Normal    3  Echocardiogram: Normal except for possible grade 1 diastolic dysfunction, mild MR, mild TR    4  Stress ECHO: Normal    5  Coronary Calcium CT: 0 score in 2018    6  The 10-year ASCVD risk score (Kosta Cesar et al , 2013) is: 8 5%    Values used to calculate the score:      Age: 54 years      Sex: Male      Is Non- : No      Diabetic: No      Tobacco smoker: Yes      Systolic Blood Pressure: 907 mmHg      Is BP treated: No      HDL Cholesterol: 46 mg/dL      Total Cholesterol: 226 mg/dL     7  HSCRP:   Lab Results   Component Value Date    HSCRP 1 41 09/24/2020       Impression and Recommendations:    1  ASVCD risk score now at 8 5% which is intermediate risk  2  Need to be more aggressive with lifestyle and dietary control  3  Consider statin therapy  4  Closer assessment of blood pressure, on a monthly basis, to ensure average blood pressure less than 135/85    Considering minor changes on echocardiogram

## 2020-09-24 NOTE — PROGRESS NOTES
Hearing Assessment Summary:     Hearing Screening    125Hz 250Hz 500Hz 1000Hz 2000Hz 3000Hz 4000Hz 6000Hz 8000Hz   Right ear:  15 10 10 5 20 15 20 5   Left ear:  15 10 5 10 15 20 10 5   Comments: HEARING EVALUATION    Name:  Beryl Davenport  :  1970  Age:  54 y o  Date of Evaluation: 20     History: [facility] Exam  Reason for visit: Beryl Davenport is being seen today for an evaluation of hearing as part of his [facility] physical   Patient reports no concern over hearing  He denies ear pain, tinnitus or dizziness  EVALUATION:    Otoscopic Evaluation:   Right Ear: Clear and healthy ear canal and tympanic membrane   Left Ear: Clear and healthy ear canal and tympanic membrane    Tympanometry:   Right: Type A - normal middle ear pressure and compliance   Left: Type A - normal middle ear pressure and compliance    Audiogram Results:  Normal peripheral hearing sensitivity in each ear  *see attached audiogram      RECOMMENDATIONS:  Return to [facility]  for F/U and Hearing Protection when needed  PATIENT EDUCATION:   Discussed results and recommendations with patient  Questions were addressed and the patient was encouraged to contact our department should concerns arise        Margo Meade   Clinical Audiologist       Visual Acuity Screening    Right eye Left eye Both eyes   Without correction:      With correction: 20/25 20/13 20/20

## 2020-09-29 LAB
CHEST PAIN STATEMENT: NORMAL
MAX DIASTOLIC BP: 106 MMHG
MAX HEART RATE: 166 BPM
MAX PREDICTED HEART RATE: 173 BPM
MAX. SYSTOLIC BP: 200 MMHG
PROTOCOL NAME: NORMAL
REASON FOR TERMINATION: NORMAL
TARGET HR FORMULA: NORMAL
TEST INDICATION: NORMAL
TIME IN EXERCISE PHASE: NORMAL

## 2021-05-07 ENCOUNTER — IMMUNIZATIONS (OUTPATIENT)
Dept: FAMILY MEDICINE CLINIC | Facility: HOSPITAL | Age: 51
End: 2021-05-07

## 2021-05-07 DIAGNOSIS — Z23 ENCOUNTER FOR IMMUNIZATION: Primary | ICD-10-CM

## 2021-05-07 PROCEDURE — 0001A SARS-COV-2 / COVID-19 MRNA VACCINE (PFIZER-BIONTECH) 30 MCG: CPT

## 2021-05-07 PROCEDURE — 91300 SARS-COV-2 / COVID-19 MRNA VACCINE (PFIZER-BIONTECH) 30 MCG: CPT

## 2021-06-02 ENCOUNTER — IMMUNIZATIONS (OUTPATIENT)
Dept: FAMILY MEDICINE CLINIC | Facility: HOSPITAL | Age: 51
End: 2021-06-02

## 2021-06-02 DIAGNOSIS — Z23 ENCOUNTER FOR IMMUNIZATION: Primary | ICD-10-CM

## 2021-06-02 PROCEDURE — 0002A SARS-COV-2 / COVID-19 MRNA VACCINE (PFIZER-BIONTECH) 30 MCG: CPT

## 2021-06-02 PROCEDURE — 91300 SARS-COV-2 / COVID-19 MRNA VACCINE (PFIZER-BIONTECH) 30 MCG: CPT

## 2021-12-16 ENCOUNTER — OFFICE VISIT (OUTPATIENT)
Dept: FAMILY MEDICINE CLINIC | Facility: CLINIC | Age: 51
End: 2021-12-16

## 2021-12-16 ENCOUNTER — HOSPITAL ENCOUNTER (OUTPATIENT)
Dept: NON INVASIVE DIAGNOSTICS | Facility: CLINIC | Age: 51
Discharge: HOME | End: 2021-12-16

## 2021-12-16 ENCOUNTER — APPOINTMENT (OUTPATIENT)
Dept: LAB | Facility: CLINIC | Age: 51
End: 2021-12-16

## 2021-12-16 VITALS
TEMPERATURE: 98.2 F | WEIGHT: 194.6 LBS | OXYGEN SATURATION: 100 % | HEART RATE: 54 BPM | DIASTOLIC BLOOD PRESSURE: 74 MMHG | SYSTOLIC BLOOD PRESSURE: 126 MMHG | BODY MASS INDEX: 27.24 KG/M2 | HEIGHT: 71 IN | RESPIRATION RATE: 14 BRPM

## 2021-12-16 VITALS
HEART RATE: 60 BPM | WEIGHT: 194 LBS | SYSTOLIC BLOOD PRESSURE: 126 MMHG | HEIGHT: 71 IN | BODY MASS INDEX: 27.16 KG/M2 | DIASTOLIC BLOOD PRESSURE: 74 MMHG

## 2021-12-16 DIAGNOSIS — Z00.00 ENCOUNTER FOR PREVENTIVE HEALTH EXAMINATION: ICD-10-CM

## 2021-12-16 DIAGNOSIS — Z83.71 FAMILY HISTORY OF COLONIC POLYPS: ICD-10-CM

## 2021-12-16 DIAGNOSIS — M72.2 PLANTAR FASCIITIS OF LEFT FOOT: ICD-10-CM

## 2021-12-16 DIAGNOSIS — R31.29 MICROSCOPIC HEMATURIA: ICD-10-CM

## 2021-12-16 DIAGNOSIS — R73.09 ELEVATED GLUCOSE: ICD-10-CM

## 2021-12-16 DIAGNOSIS — E78.2 MIXED HYPERLIPIDEMIA: ICD-10-CM

## 2021-12-16 DIAGNOSIS — Z00.00 WELL ADULT EXAM: Primary | ICD-10-CM

## 2021-12-16 PROBLEM — M17.0 PRIMARY OSTEOARTHRITIS OF BOTH KNEES: Status: ACTIVE | Noted: 2021-12-16

## 2021-12-16 PROBLEM — I71.20 THORACIC AORTIC ANEURYSM WITHOUT RUPTURE: Status: ACTIVE | Noted: 2021-12-16

## 2021-12-16 PROBLEM — I71.2 THORACIC AORTIC ANEURYSM WITHOUT RUPTURE (HCC): Status: ACTIVE | Noted: 2021-12-16

## 2021-12-16 LAB
25(OH)D3 SERPL-MCNC: 19.9 NG/ML (ref 30–100)
ALBUMIN SERPL BCP-MCNC: 4.4 G/DL (ref 3.5–5)
ALP SERPL-CCNC: 52 U/L (ref 46–116)
ALT SERPL W P-5'-P-CCNC: 29 U/L (ref 12–78)
ANION GAP SERPL CALCULATED.3IONS-SCNC: 7 MMOL/L (ref 4–13)
AORTIC ROOT: 3.7 CM
APICAL FOUR CHAMBER EJECTION FRACTION: 66 %
ASCENDING AORTA: 3.7 CM
AST SERPL W P-5'-P-CCNC: 18 U/L (ref 5–45)
ATRIAL RATE: 72 BPM
BACTERIA UR QL AUTO: ABNORMAL /HPF
BASOPHILS # BLD AUTO: 0.04 THOUSANDS/ΜL (ref 0–0.1)
BASOPHILS NFR BLD AUTO: 1 % (ref 0–1)
BILIRUB SERPL-MCNC: 0.8 MG/DL (ref 0.2–1)
BILIRUB UR QL STRIP: NEGATIVE
BUN SERPL-MCNC: 16 MG/DL (ref 5–25)
CALCIUM SERPL-MCNC: 8.7 MG/DL (ref 8.3–10.1)
CHLORIDE SERPL-SCNC: 103 MMOL/L (ref 100–108)
CHOLEST SERPL-MCNC: 230 MG/DL
CLARITY UR: CLEAR
CO2 SERPL-SCNC: 30 MMOL/L (ref 21–32)
COLOR UR: YELLOW
CREAT SERPL-MCNC: 1.15 MG/DL (ref 0.6–1.3)
CRP SERPL HS-MCNC: 4.46 MG/L
E WAVE DECELERATION TIME: 192 MS
EOSINOPHIL # BLD AUTO: 0.08 THOUSAND/ΜL (ref 0–0.61)
EOSINOPHIL NFR BLD AUTO: 1 % (ref 0–6)
ERYTHROCYTE [DISTWIDTH] IN BLOOD BY AUTOMATED COUNT: 13.5 % (ref 11.6–15.1)
EST. AVERAGE GLUCOSE BLD GHB EST-MCNC: 120 MG/DL
FRACTIONAL SHORTENING: 34 % (ref 28–44)
GFR SERPL CREATININE-BSD FRML MDRD: 74 ML/MIN/1.73SQ M
GLUCOSE P FAST SERPL-MCNC: 102 MG/DL (ref 65–99)
GLUCOSE UR STRIP-MCNC: NEGATIVE MG/DL
HBA1C MFR BLD: 5.8 %
HCT VFR BLD AUTO: 46.6 % (ref 36.5–49.3)
HCV AB SER QL: NORMAL
HDLC SERPL-MCNC: 47 MG/DL
HGB BLD-MCNC: 14.9 G/DL (ref 12–17)
HGB UR QL STRIP.AUTO: ABNORMAL
IMM GRANULOCYTES # BLD AUTO: 0.01 THOUSAND/UL (ref 0–0.2)
IMM GRANULOCYTES NFR BLD AUTO: 0 % (ref 0–2)
INTERVENTRICULAR SEPTUM IN DIASTOLE (PARASTERNAL SHORT AXIS VIEW): 1.1 CM
KETONES UR STRIP-MCNC: NEGATIVE MG/DL
LDLC SERPL CALC-MCNC: 167 MG/DL (ref 0–100)
LEFT ATRIUM AREA SYSTOLE SINGLE PLANE A4C: 19.6 CM2
LEFT INTERNAL DIMENSION IN SYSTOLE: 2.7 CM (ref 2.1–4)
LEFT VENTRICULAR INTERNAL DIMENSION IN DIASTOLE: 4.1 CM (ref 5.42–8.07)
LEFT VENTRICULAR POSTERIOR WALL IN END DIASTOLE: 1.1 CM
LEFT VENTRICULAR STROKE VOLUME: 46 ML
LEUKOCYTE ESTERASE UR QL STRIP: NEGATIVE
LYMPHOCYTES # BLD AUTO: 1.69 THOUSANDS/ΜL (ref 0.6–4.47)
LYMPHOCYTES NFR BLD AUTO: 28 % (ref 14–44)
MCH RBC QN AUTO: 30.2 PG (ref 26.8–34.3)
MCHC RBC AUTO-ENTMCNC: 32 G/DL (ref 31.4–37.4)
MCV RBC AUTO: 95 FL (ref 82–98)
MONOCYTES # BLD AUTO: 0.51 THOUSAND/ΜL (ref 0.17–1.22)
MONOCYTES NFR BLD AUTO: 8 % (ref 4–12)
MUCOUS THREADS UR QL AUTO: ABNORMAL
MV E'TISSUE VEL-SEP: 10 CM/S
MV PEAK A VEL: 0.93 M/S
MV PEAK E VEL: 77 CM/S
MV STENOSIS PRESSURE HALF TIME: 0 MS
NEUTROPHILS # BLD AUTO: 3.73 THOUSANDS/ΜL (ref 1.85–7.62)
NEUTS SEG NFR BLD AUTO: 62 % (ref 43–75)
NITRITE UR QL STRIP: NEGATIVE
NON-SQ EPI CELLS URNS QL MICRO: ABNORMAL /HPF
NRBC BLD AUTO-RTO: 0 /100 WBCS
P AXIS: 24 DEGREES
PH UR STRIP.AUTO: 6 [PH]
PLATELET # BLD AUTO: 219 THOUSANDS/UL (ref 149–390)
PMV BLD AUTO: 11.2 FL (ref 8.9–12.7)
POTASSIUM SERPL-SCNC: 4.3 MMOL/L (ref 3.5–5.3)
PR INTERVAL: 164 MS
PROT SERPL-MCNC: 7.6 G/DL (ref 6.4–8.2)
PROT UR STRIP-MCNC: NEGATIVE MG/DL
PSA SERPL-MCNC: 1 NG/ML (ref 0–4)
QRS AXIS: 14 DEGREES
QRSD INTERVAL: 86 MS
QT INTERVAL: 420 MS
QTC INTERVAL: 459 MS
RBC # BLD AUTO: 4.93 MILLION/UL (ref 3.88–5.62)
RBC #/AREA URNS AUTO: ABNORMAL /HPF
RIGHT ATRIUM AREA SYSTOLE A4C: 15.6 CM2
RIGHT VENTRICLE ID DIMENSION: 3.1 CM
RV PSP: 37 MMHG
SL CV LV EF: 55
SL CV PED ECHO LEFT VENTRICLE DIASTOLIC VOLUME (MOD BIPLANE) 2D: 74 ML
SL CV PED ECHO LEFT VENTRICLE SYSTOLIC VOLUME (MOD BIPLANE) 2D: 27 ML
SODIUM SERPL-SCNC: 140 MMOL/L (ref 136–145)
SP GR UR STRIP.AUTO: 1.02 (ref 1–1.03)
T WAVE AXIS: 5 DEGREES
TRICUSPID VALVE PEAK REGURGITATION VELOCITY: 2.6 M/S
TRICUSPID VALVE S': 0.8 CM/S
TRIGL SERPL-MCNC: 79 MG/DL
TSH SERPL DL<=0.05 MIU/L-ACNC: 1.04 UIU/ML (ref 0.36–3.74)
TV PEAK GRADIENT: 27 MMHG
UROBILINOGEN UR QL STRIP.AUTO: 0.2 E.U./DL
VENTRICULAR RATE: 72 BPM
WBC # BLD AUTO: 6.06 THOUSAND/UL (ref 4.31–10.16)
WBC #/AREA URNS AUTO: ABNORMAL /HPF
Z-SCORE OF LEFT VENTRICULAR DIMENSION IN END SYSTOLE: -4.78

## 2021-12-16 PROCEDURE — 93010 ELECTROCARDIOGRAM REPORT: CPT | Performed by: INTERNAL MEDICINE

## 2021-12-16 PROCEDURE — 93005 ELECTROCARDIOGRAM TRACING: CPT

## 2021-12-16 PROCEDURE — 81001 URINALYSIS AUTO W/SCOPE: CPT

## 2021-12-16 PROCEDURE — 84443 ASSAY THYROID STIM HORMONE: CPT

## 2021-12-16 PROCEDURE — 84153 ASSAY OF PSA TOTAL: CPT

## 2021-12-16 PROCEDURE — 80053 COMPREHEN METABOLIC PANEL: CPT

## 2021-12-16 PROCEDURE — 99499EX: Performed by: INTERNAL MEDICINE

## 2021-12-16 PROCEDURE — 93306 TTE W/DOPPLER COMPLETE: CPT | Performed by: INTERNAL MEDICINE

## 2021-12-16 PROCEDURE — 86141 C-REACTIVE PROTEIN HS: CPT

## 2021-12-16 PROCEDURE — 93306 TTE W/DOPPLER COMPLETE: CPT

## 2021-12-16 PROCEDURE — 82306 VITAMIN D 25 HYDROXY: CPT

## 2021-12-16 PROCEDURE — 93351 STRESS TTE COMPLETE: CPT | Performed by: INTERNAL MEDICINE

## 2021-12-16 PROCEDURE — 93350 STRESS TTE ONLY: CPT

## 2021-12-16 PROCEDURE — 80061 LIPID PANEL: CPT

## 2021-12-16 PROCEDURE — 85025 COMPLETE CBC W/AUTO DIFF WBC: CPT

## 2021-12-16 PROCEDURE — 83036 HEMOGLOBIN GLYCOSYLATED A1C: CPT

## 2021-12-16 PROCEDURE — 36415 COLL VENOUS BLD VENIPUNCTURE: CPT

## 2021-12-16 PROCEDURE — 86803 HEPATITIS C AB TEST: CPT

## 2021-12-17 ENCOUNTER — TELEPHONE (OUTPATIENT)
Dept: INTERNAL MEDICINE CLINIC | Facility: CLINIC | Age: 51
End: 2021-12-17

## 2021-12-23 LAB
CHEST PAIN STATEMENT: NORMAL
MAX DIASTOLIC BP: 88 MMHG
MAX HEART RATE: 157 BPM
MAX PREDICTED HEART RATE: 171 BPM
MAX. SYSTOLIC BP: 192 MMHG
PROTOCOL NAME: NORMAL
REASON FOR TERMINATION: NORMAL
TARGET HR FORMULA: NORMAL
TEST INDICATION: NORMAL
TIME IN EXERCISE PHASE: NORMAL

## 2022-01-07 LAB
RATE PRESSURE PRODUCT: NORMAL
SL CV STRESS RECOVERY BP: NORMAL MMHG
SL CV STRESS RECOVERY HR: 99 BPM
STRESS ANGINA INDEX: 0
STRESS BASELINE BP: NORMAL MMHG
STRESS BASELINE HR: 70 BPM
STRESS O2 SAT REST: 98 %
STRESS PEAK HR: 157 BPM
STRESS PERCENT HR: 91 %
STRESS POST ESTIMATED WORKLOAD: 13.4 METS
STRESS POST EXERCISE DUR MIN: 12 MIN
STRESS POST O2 SAT PEAK: 97 %
STRESS POST PEAK BP: 170 MMHG
STRESS TARGET HR: 157 BPM

## 2022-03-18 ENCOUNTER — TELEPHONE (OUTPATIENT)
Dept: GASTROENTEROLOGY | Facility: CLINIC | Age: 52
End: 2022-03-18

## 2022-03-18 ENCOUNTER — PREP FOR PROCEDURE (OUTPATIENT)
Dept: GASTROENTEROLOGY | Facility: CLINIC | Age: 52
End: 2022-03-18

## 2022-03-18 ENCOUNTER — TELEPHONE (OUTPATIENT)
Dept: INTERNAL MEDICINE CLINIC | Facility: CLINIC | Age: 52
End: 2022-03-18

## 2022-03-18 DIAGNOSIS — M72.2 PLANTAR FASCIITIS: Primary | ICD-10-CM

## 2022-03-18 DIAGNOSIS — Z12.11 COLON CANCER SCREENING: Primary | ICD-10-CM

## 2022-03-18 NOTE — TELEPHONE ENCOUNTER
Patient called and saw you in December and asked about a foot referral at his last appointment  He asked if you could please enter an orthopedic referral and I can call him back at [phone]    Thank you

## 2022-03-18 NOTE — TELEPHONE ENCOUNTER
Scheduled date of colonoscopy (as of today): 7/6/22  Physician performing colonoscopy: Dr Corona  Location of colonoscopy: [facility]  Bowel prep reviewed with patient: Miralax/Dulcolax  Instructions reviewed with patient by: Abbey/cielo  Clearances: N/A

## 2022-04-09 ENCOUNTER — OFFICE VISIT (OUTPATIENT)
Dept: OBGYN CLINIC | Facility: CLINIC | Age: 52
End: 2022-04-09
Payer: COMMERCIAL

## 2022-04-09 ENCOUNTER — APPOINTMENT (OUTPATIENT)
Dept: RADIOLOGY | Facility: AMBULARY SURGERY CENTER | Age: 52
End: 2022-04-09
Payer: COMMERCIAL

## 2022-04-09 VITALS
HEIGHT: 71 IN | DIASTOLIC BLOOD PRESSURE: 93 MMHG | SYSTOLIC BLOOD PRESSURE: 136 MMHG | HEART RATE: 51 BPM | WEIGHT: 202.2 LBS | BODY MASS INDEX: 28.31 KG/M2

## 2022-04-09 DIAGNOSIS — M79.672 PAIN IN LEFT FOOT: Primary | ICD-10-CM

## 2022-04-09 DIAGNOSIS — M72.2 PLANTAR FASCIITIS: ICD-10-CM

## 2022-04-09 DIAGNOSIS — M79.672 PAIN IN LEFT FOOT: ICD-10-CM

## 2022-04-09 PROCEDURE — 1036F TOBACCO NON-USER: CPT | Performed by: PHYSICAL MEDICINE & REHABILITATION

## 2022-04-09 PROCEDURE — 99203 OFFICE O/P NEW LOW 30 MIN: CPT | Performed by: PHYSICAL MEDICINE & REHABILITATION

## 2022-04-09 PROCEDURE — 73630 X-RAY EXAM OF FOOT: CPT

## 2022-04-09 PROCEDURE — 3008F BODY MASS INDEX DOCD: CPT | Performed by: PHYSICAL MEDICINE & REHABILITATION

## 2022-04-09 NOTE — PATIENT INSTRUCTIONS
Room temperature/warm soaks with epsom salt can help with muscle tightness/cramping  Epsom salt releases magnesium which can be helpful  You will be starting physical therapy  It is important to do home exercises as given by your physical therapist as you go through PT to speed up your recovery  Physical therapy addresses the problems that are causing your pain, instead of covering them up, as some other treatment options do

## 2022-04-09 NOTE — PROGRESS NOTES
1  Pain in left foot  XR foot 3+ vw left    Ambulatory referral to Physical Therapy   2  Plantar fasciitis  Ambulatory Referral to Orthopedic Surgery     Orders Placed This Encounter   Procedures    XR foot 3+ vw left    Ambulatory referral to Physical Therapy        Impression:  Left foot tightness and tingling likely multifactorial and secondary to myofascial spasticity and possibly peripheral nerve entrapment due to this  Also on the differential is an abductor digiti minimi strain  We will have the patient start formal physical therapy  He can use Epsom salt soaks and can look into obtaining different footwear to help with his symptoms  I will see him back in six weeks to reassess  If still symptomatic, could consider advanced imaging  Imaging Studies (I personally reviewed images in PACS and report):  Left foot x-rays most recent to this encounter reviewed  These images show no acute osseous abnormalities or severe degeneration  Return in about 6 weeks (around 5/21/2022), or if symptoms worsen or fail to improve  Patient is in agreement with the above plan  HPI:  Camille Bellamy is a 54 y o  male  who presents for evaluation of   Chief Complaint   Patient presents with    Left Foot - Pain       Onset/Mechanism: Started in December without an injury  Location: Ball of the foot and into the lateral toes  Radiation: Denies  Provocative: Running  Severity: No changes  Associated Symptoms: Tingling in the little toe after running  Treatment so far: No recent treatment  Following history reviewed and updated:  History reviewed  No pertinent past medical history  History reviewed  No pertinent surgical history    Social History   Social History     Substance and Sexual Activity   Alcohol Use Yes    Alcohol/week: 2 0 standard drinks    Types: 2 Cans of beer per week     Social History     Substance and Sexual Activity   Drug Use No     Social History     Tobacco Use   Smoking Status Former Smoker    Types: Cigars   Smokeless Tobacco Never Used     Family History   Problem Relation Age of Onset    Breast cancer Mother     Diabetes type II Father     Hypertension Father      No Known Allergies     Constitutional:  /93   Pulse (!) 51   Ht 5' 11" (1 803 m)   Wt 91 7 kg (202 lb 3 2 oz)   BMI 28 20 kg/m²    General: NAD  Eyes: Anicteric sclerae  Neck: Supple  Lungs: Unlabored breathing  Cardiovascular: No lower extremity edema  Skin: Intact without erythema  Neurologic: Sensation intact to light touch  Psychiatric: Mood and affect are appropriate  Left Ankle Exam     Tenderness   The patient is experiencing no tenderness  Swelling: none    Range of Motion   The patient has normal left ankle ROM  Muscle Strength   The patient has normal left ankle strength      Other   Erythema: absent  Scars: absent  Sensation: normal  Pulse: present             Procedures

## 2022-04-26 NOTE — PROGRESS NOTES
PT EVALUATION    Today's date: 22  Patient name: Mook Velazquez  : 1970  MRN: [id number]  Referring provider: Tonja Hughes DO  Dx:   1  Pain in left foot        ASSESSMENT:   Mook Velazquez is a 54 y o  male who presents with signs and symptoms consistent of chronic left foot pain  Patient presents with pain, decreased strength, decreased ROM and decreased joint mobility  Patient primary movement problem is related to imbalances between mobility deficits in the ankle and foot as well as gastroc extensibility  Patient demonstrates tightness in the muscle bulk of the lateral plantar surface  Due to these impairments, Patient has difficulty performing a/iadls and recreational activities  Patient would benefit from skilled physical therapy to address the impairments, improve their level of function, and to improve their overall quality of life  Impairments:    restricted ROM    decreased strength   pain with function   activity intolerance   weight bearing intolerance     Prognosis:  Good  Positive and negative prognostic indicator(s):  pain >3 months    Goals:    Short Term Goals: to be achieved by 4 weeks  1) Patient to be independent with basic HEP  2) Decrease pain to 1/10 at its worst   3) Increase ankle DF ROM by 5-10 degrees   4) Increase LE strength by 1/2 MMT grade in all deficient planes  Long Term Goals: to be achieved by discharge  1) FOTO equal to or greater than indicating improvements with overall function  2) Patient will demonstrate little to no pain in the morning in order to improve quality of life  3) Patient will be able to return to running with little to no limitations in order to participate in recreational activities  4) Patient will be competent in comprehensive HEP in order to manage symptoms on his own         Planned interventions:  home exercise program, patient education, manual therapy, graded activity, flexibility, functional range of motion exercises, strengthening, balance and weight bearing training, gait training and low level laser with IASTM    Duration in visits:  8-12  Frequency: 1-2 visits per week  Duration in weeks:  4-6    History of Current Injury: Patient notes that back in December he woke up and he had pain in his foot  Patient notes that its not really a pain but more of a tightness  Patient cannot not think of a certain MARTINE but just started one morning  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  Patient notes that he has tried rolling it out and using the epsin salts but nothing seems to loosen it up  Patient notes that the pain isnt constant but will notice it with standing walking or running  Patient notes that he has the most tightness in the morning  Patient notes that it will loosen up throughout the pain  Patient notes that he runs about 2 miles 3x a week and he has stopped that since this has started  Patient notes that he runs in [place]  Patient notes that the are about 1years old  Patient notes that the numbness in the pinky toe will start right away as soon as he starts running  Patient notes when he crosses his feet at work sitting the foot will be numbness but that has been going on before this issue  Patient is not currently running because of the tightness and now uses the ellipitical  Patient notes that he hasnt been running his January because of this  Pain location: plantar plantar surface near the pinky toes  Pain descriptors:  Tightness; numbness in the pinky toe   Pain at Currently:  0/10  Pain at Best: 0/10  Pain at Worst: 2-3/10      Aggravating factors: standing, walking, running (on the treadmill)   Easing factors: n/a    Imaging: n/a  Special Questions: no sleep disturbances, no radiating symptoms, no radiating numbness or tingling  Hobbies/Interests: helping out with kids sports and running     Occupation: desk work   Patient goals: Patient reports goals for physical therapy would be to decrease tightness in the foot and get back to running  Objective     Neurological Testing     Sensation     Ankle/Foot   Left Ankle/Foot   Intact: light touch    Right Ankle/Foot   Intact: light touch     Active Range of Motion   Left Ankle/Foot   Dorsiflexion (ke): 90 degrees   Dorsiflexion (kf): 98 degrees   Plantar flexion: 30 degrees   Inversion: WFL  Eversion: WFL  Great toe flexion: WFL  Great toe extension: WFL    Right Ankle/Foot   Normal active range of motion    Additional Active Range of Motion Details  Decreased flexion of the 4th and 5th digit  Joint Play   Left Ankle/Foot  Hypomobile in the talocrural joint, subtalar joint and midfoot  Strength/Myotome Testing     Left Hip   Planes of Motion   Flexion: 5  Extension: 4  Abduction: 4+    Isolated Muscles   Gluteus nicholas: 4  Gluteus medius: 4+    Right Hip   Planes of Motion   Flexion: 5  Extension: 4  Abduction: 4+    Isolated Muscles   Gluteus maximums: 4  Gluteus medius: 4+    Left Ankle/Foot   Dorsiflexion: 4+  Plantar flexion: 4+  Inversion: 4+  Eversion: 4+  Great toe extension: 4+    Tests   Left Ankle/Foot   Positive for forefoot valgus  Negative for Tinel's sign (tarsal tunnel)  Ambulation     Quality of Movement During Gait     Foot Alignment    Foot Alignment (Left): Positive cavus and rigid hindfoot             Precautions: n/a      Manuals             Talocrural mobs              Subtalar mobs              Mid foot mobs              IASTM to the lateral plantar foot              Neuro Re-Ed             SLS              Pebble pickup              Short foot in standing              SLS  anti-rotation with TB                                                    Ther Ex             gastroc stretch on SB             gastroc and toe stretch with towel             DF wall lunge stretch              Slider lunges              Tennis ball press downs for posterior tib                                        [place]             Ther Activity             Wall squats                           Gait Training                                       Modalities

## 2022-04-28 ENCOUNTER — EVALUATION (OUTPATIENT)
Dept: PHYSICAL THERAPY | Facility: CLINIC | Age: 52
End: 2022-04-28
Payer: COMMERCIAL

## 2022-04-28 DIAGNOSIS — M79.672 PAIN IN LEFT FOOT: ICD-10-CM

## 2022-04-28 PROCEDURE — 97110 THERAPEUTIC EXERCISES: CPT | Performed by: PHYSICAL THERAPIST

## 2022-04-28 PROCEDURE — 97162 PT EVAL MOD COMPLEX 30 MIN: CPT | Performed by: PHYSICAL THERAPIST

## 2022-05-03 ENCOUNTER — OFFICE VISIT (OUTPATIENT)
Dept: PHYSICAL THERAPY | Facility: CLINIC | Age: 52
End: 2022-05-03
Payer: COMMERCIAL

## 2022-05-03 DIAGNOSIS — M79.672 PAIN IN LEFT FOOT: Primary | ICD-10-CM

## 2022-05-03 PROCEDURE — 97112 NEUROMUSCULAR REEDUCATION: CPT | Performed by: PHYSICAL THERAPIST

## 2022-05-03 PROCEDURE — 97140 MANUAL THERAPY 1/> REGIONS: CPT | Performed by: PHYSICAL THERAPIST

## 2022-05-03 PROCEDURE — 97110 THERAPEUTIC EXERCISES: CPT | Performed by: PHYSICAL THERAPIST

## 2022-05-03 NOTE — PROGRESS NOTES
Daily Note     Today's date: 5/3/2022  Patient name: Sandra Mattson  : 1970  MRN: [id number]  Referring provider: My Fam DO  Dx:   Encounter Diagnosis     ICD-10-CM    1  Pain in left foot  M79 672        Start Time: 1615  Stop Time: 1700  Total time in clinic (min): 45 minutes    Subjective: Patient reports, "The foot doesn't feel any different  The exercises were fine at home "       Objective: See treatment diary below  Tinel's: lateral and medial ankle negative   Neural tension: sciatic nerve negative; posterior tibial bias: negative   Tarsal tunnel: negative for pain or numbness     Assessment: Patient tolerated treatment well  Patient responded well to the introduction of exercise program this date  Focused on ankle and foot strengthen as well as fascia on the plantar surface  Patient demonstrated decreased muscle bulk in the left calf compared to right  Assessed neural tension along the length of the sciatic nerve and and posterior tibial nerve  Patient had no changes with distal symptoms  Patient had negative entrapment pain or tingling in the tarsal tunnel  Patient demonstrated some tightness in the lateral plantar fascia and muscle bulk  Will continue to address the ankle and foot region and if needed will address higher along the length of the nerve  Updated HEP this date  Will continue to progress as able  Patient would benefit from continued PT      Plan: Continue per plan of care        Precautions: n/a      Manuals 5/3            Talocrural mobs              Subtalar mobs              Mid foot mobs              IASTM to the lateral plantar foot              Neuro Re-Ed             SLS              Pebble pickup  NV            Short foot NV            SLS  anti-rotation with TB             Standing clamshells SLS 2x10 ea             Toe walking with weight  10# ea hand     3x full //bar length (1lap down and back)                          Ther Ex             gastroc stretch on SB 3x30" gastroc and toe stretch with towel             DF wall lunge stretch              Slider lunges              Tennis ball press downs for posterior tib              Toe stretch on stretch board  3x30"            X-walks              Heel raises  2 up and 1 down   2x10                                                    Bike---> [place] upright bike lvl 3 5 min             Ther Activity             Wall squats                           Gait Training                                       Modalities

## 2022-05-06 ENCOUNTER — OFFICE VISIT (OUTPATIENT)
Dept: PHYSICAL THERAPY | Facility: CLINIC | Age: 52
End: 2022-05-06
Payer: COMMERCIAL

## 2022-05-06 DIAGNOSIS — M79.672 PAIN IN LEFT FOOT: Primary | ICD-10-CM

## 2022-05-06 PROCEDURE — 97140 MANUAL THERAPY 1/> REGIONS: CPT | Performed by: PHYSICAL THERAPIST

## 2022-05-06 PROCEDURE — 97110 THERAPEUTIC EXERCISES: CPT | Performed by: PHYSICAL THERAPIST

## 2022-05-06 PROCEDURE — 97112 NEUROMUSCULAR REEDUCATION: CPT | Performed by: PHYSICAL THERAPIST

## 2022-05-06 NOTE — PROGRESS NOTES
Daily Note     Today's date: 2022  Patient name: Margie Adams  : 1970  MRN: [id number]  Referring provider: Ayo Wesley DO  Dx:   Encounter Diagnosis     ICD-10-CM    1  Pain in left foot  M79 672        Start Time: 0815  Stop Time: 0900  Total time in clinic (min): 45 minutes    Subjective: Patient reports, "The foot was a little sore in the spot where you worked on it last time but nothing terrible  The foot feels the same "       Objective: See treatment diary below      Assessment: Patient tolerated treatment well  Assessed neural tension along the length of the of the tibial nerve, peroneal, and sural nerve  Patient had no changes with distal symptoms  Will assess end range  and UPAs in the lumbar spine to further assess and rule out lumbar involvement  Upon manual treatment patient did have some increase in 5th digit numbness with deep pressure on the lateral plantar surface  Will continue to address the ankle and foot region and if needed will address higher along the length of the nerve  Updated HEP this date  Will continue to progress as able  Patient would benefit from continued PT      Plan: Continue per plan of care        Precautions: n/a      Manuals /3 5/6           Talocrural mobs              Subtalar mobs              Mid foot mobs   ACL           IASTM to the lateral plantar foot   ACL           Neuro Re-Ed             SLS              Pebble pickup  NV 3x full cup            Short foot NV 5" hold 20x           SLS  anti-rotation with TB             Standing clamshells SLS 2x10 ea  2x10 ea            Toe walking with weight  10# ea hand     3x full //bar length (1lap down and back)  10# ea hand     3x full //bar length (1lap down and back)                         Ther Ex             gastroc stretch on SB 3x30"  3x30"            gastroc and toe stretch with towel             DF wall lunge stretch              Slider lunges              Tennis ball press downs for posterior tib              Toe stretch on stretch board  3x30"            X-walks              Heel raises  2 up and 1 down   2x10  2 up and 1 down   2x10                                                   Bike---> [place] upright bike lvl 3 5 min  upright bike lvl 3 5 min            Ther Activity             Wall squats                           Gait Training                                       Modalities

## 2022-05-12 ENCOUNTER — APPOINTMENT (OUTPATIENT)
Dept: PHYSICAL THERAPY | Facility: CLINIC | Age: 52
End: 2022-05-12
Payer: COMMERCIAL

## 2022-05-13 ENCOUNTER — APPOINTMENT (OUTPATIENT)
Dept: PHYSICAL THERAPY | Facility: CLINIC | Age: 52
End: 2022-05-13
Payer: COMMERCIAL

## 2022-05-17 ENCOUNTER — APPOINTMENT (OUTPATIENT)
Dept: PHYSICAL THERAPY | Facility: CLINIC | Age: 52
End: 2022-05-17
Payer: COMMERCIAL

## 2022-05-26 ENCOUNTER — OFFICE VISIT (OUTPATIENT)
Dept: PHYSICAL THERAPY | Facility: CLINIC | Age: 52
End: 2022-05-26
Payer: COMMERCIAL

## 2022-05-26 DIAGNOSIS — M79.672 PAIN IN LEFT FOOT: Primary | ICD-10-CM

## 2022-05-26 PROCEDURE — 97110 THERAPEUTIC EXERCISES: CPT | Performed by: PHYSICAL THERAPIST

## 2022-05-26 PROCEDURE — 97112 NEUROMUSCULAR REEDUCATION: CPT | Performed by: PHYSICAL THERAPIST

## 2022-05-26 PROCEDURE — 97140 MANUAL THERAPY 1/> REGIONS: CPT | Performed by: PHYSICAL THERAPIST

## 2022-05-26 NOTE — PROGRESS NOTES
Daily Note     Today's date: 2022  Patient name: Romina Busch  : 1970  MRN: [id number]  Referring provider: Nayeli Duncan DO  Dx:   Encounter Diagnosis     ICD-10-CM    1  Pain in left foot  M79 672                   Subjective: patient reports no changes in symptoms  Objective: See treatment diary below      Assessment: Patient tolerated treatment well  He demonstrates good technique for provided program  No reports of increased symptoms throughout session  Good tolerance to IASTM and mobs today  He would benefit from continued PT  Plan: Continue per plan of care        Precautions: n/a       Manuals 5/3 5/6 5/26          Talocrural mobs    SK          Subtalar mobs              Mid foot mobs   ACL SK          IASTM to the lateral plantar foot   ACL SK          Neuro Re-Ed             SLS              Pebble pickup  NV 3x full cup  3x full cup           Short foot NV 5" hold 20x 5" hold 20x           SLS  anti-rotation with TB             Standing clamshells SLS 2x10 ea  2x10 ea  2x10 ea          Toe walking with weight  10# ea hand     3x full //bar length (1lap down and back)  10# ea hand     3x full //bar length (1lap down and back)  10# ea hand     3x full //bar length (1lap down and back)                        Ther Ex             gastroc stretch on SB 3x30"  3x30"  3x30"          gastroc and toe stretch with towel             DF wall lunge stretch              Slider lunges              Tennis ball press downs for posterior tib              Toe stretch on stretch board  3x30"  3x30"          X-walks              Heel raises  2 up and 1 down   2x10  2 up and 1 down   2x10  2 up and 1 down   2x10                                                  Bike---> [place] upright bike lvl 3 5 min  upright bike lvl 3 5 min  upright bike lvl 3 5 min           Ther Activity             Wall squats                           Gait Training                                       Modalities

## 2022-06-01 ENCOUNTER — OFFICE VISIT (OUTPATIENT)
Dept: PHYSICAL THERAPY | Facility: CLINIC | Age: 52
End: 2022-06-01
Payer: COMMERCIAL

## 2022-06-01 DIAGNOSIS — M79.672 PAIN IN LEFT FOOT: Primary | ICD-10-CM

## 2022-06-01 PROCEDURE — 97112 NEUROMUSCULAR REEDUCATION: CPT | Performed by: PHYSICAL THERAPIST

## 2022-06-01 PROCEDURE — 97110 THERAPEUTIC EXERCISES: CPT | Performed by: PHYSICAL THERAPIST

## 2022-06-01 NOTE — PROGRESS NOTES
Daily Note     Today's date: 2022  Patient name: Kaz Villaseñor  : 1970  MRN: [id number]  Referring provider: Keon Hong DO  Dx:   Encounter Diagnosis     ICD-10-CM    1  Pain in left foot  M79 672        Start Time: 815  Stop Time: 09  Total time in clinic (min): 45 minutes    Subjective: Patient notes that the symptoms are still the same  Patent notes that he did notice that when he is laying on his back stretching his hamstring that if he points his toes the middle toes will cramp up  Objective: See treatment diary below      Assessment: Patient tolerated treatment well  Patient continues to have some calf fatigue with isolated exercises  Upon testing patient does have some hamstring tightness as well as neural tension throughout the sciatic distribution however does not produce distal symptoms  Re-evaluation next visit  Patient would benefit from continued skilled physical therapy to address the impairments, improve their level of function, and to improve their overall quality of life  Plan: Continue per plan of care        Precautions: n/a       Manuals 5/3 5/6 5/26 6/1         Talocrural mobs    SK          Subtalar mobs              Mid foot mobs   ACL SK ACL         IASTM to the lateral plantar foot   ACL SK ACL         Neuro Re-Ed             SLS              Pebble pickup  NV 3x full cup  3x full cup  3x full cup          Short foot NV 5" hold 20x 5" hold 20x           SLS  anti-rotation with TB             Standing clamshells SLS 2x10 ea  2x10 ea  2x10 ea 2x10 ea GTB         Toe walking with weight  10# ea hand     3x full //bar length (1lap down and back)  10# ea hand     3x full //bar length (1lap down and back)  10# ea hand     3x full //bar length (1lap down and back)  12# ea hand     3x full //bar length (1lap down and back)                       Ther Ex             gastroc stretch on SB 3x30"  3x30"  3x30" 3x30"         gastroc and toe stretch with towel DF wall lunge stretch              Slider lunges              Tennis ball press downs for posterior tib              Toe stretch on stretch board  3x30"  3x30"          X-walks              Heel raises  2 up and 1 down   2x10  2 up and 1 down   2x10  2 up and 1 down   2x10  2 up and 1 down   2x10                                                 Bike---> [place] upright bike lvl 3 5 min  upright bike lvl 3 5 min  upright bike lvl 3 5 min  upright bike lvl 3 5 min          Ther Activity             Wall squats                           Gait Training                                       Modalities

## 2022-06-03 ENCOUNTER — OFFICE VISIT (OUTPATIENT)
Dept: PHYSICAL THERAPY | Facility: CLINIC | Age: 52
End: 2022-06-03
Payer: COMMERCIAL

## 2022-06-03 DIAGNOSIS — M79.672 PAIN IN LEFT FOOT: Primary | ICD-10-CM

## 2022-06-03 PROCEDURE — 97110 THERAPEUTIC EXERCISES: CPT | Performed by: PHYSICAL THERAPIST

## 2022-06-03 PROCEDURE — 97112 NEUROMUSCULAR REEDUCATION: CPT | Performed by: PHYSICAL THERAPIST

## 2022-06-07 ENCOUNTER — OFFICE VISIT (OUTPATIENT)
Dept: PHYSICAL THERAPY | Facility: CLINIC | Age: 52
End: 2022-06-07
Payer: COMMERCIAL

## 2022-06-07 DIAGNOSIS — M79.672 PAIN IN LEFT FOOT: Primary | ICD-10-CM

## 2022-06-07 PROCEDURE — 97112 NEUROMUSCULAR REEDUCATION: CPT | Performed by: PHYSICAL THERAPIST

## 2022-06-07 PROCEDURE — 97110 THERAPEUTIC EXERCISES: CPT | Performed by: PHYSICAL THERAPIST

## 2022-06-07 PROCEDURE — 97140 MANUAL THERAPY 1/> REGIONS: CPT | Performed by: PHYSICAL THERAPIST

## 2022-06-07 NOTE — PROGRESS NOTES
Daily Note     Today's date: 2022  Patient name: Jennifer Orta  : 1970  MRN: [id number]  Referring provider: Sung Stevens DO  Dx:   Encounter Diagnosis     ICD-10-CM    1  Pain in left foot  M79 672        Start Time: 1700  Stop Time: 174  Total time in clinic (min): 45 minutes    Subjective: Patient notes that the foot is feeling a little better  Patient notes that he feels the toe exercises have been helping  Objective: See treatment diary below      Assessment: Patient tolerated treatment well  Continue with established POC  Patient had no complaints throughout session  Introduced proximal fibular head mobs secondary to potential peripheral involvement  Patient had some tightness but post treatment had increased mobility  Patient noted decreased discomfort post manual treatment and improvement in foot control  Patient would benefit from continued skilled physical therapy to address the impairments, improve their level of function, and to improve their overall quality of life  Plan: Continue per plan of care        Precautions: n/a    Manuals 5/3 5/6 5/26 6/1 6/3 6/7       Talocrural mobs    SK          Proximal fibular mobs              Mid foot mobs   ACL SK ACL ACL ACL       IASTM to the lateral plantar foot   ACL SK ACL ACL ACL       Neuro Re-Ed             SLS              Pebble pickup  NV 3x full cup  3x full cup  3x full cup          Short foot NV 5" hold 20x 5" hold 20x    5" hold 30x       Alternating GT -toe extensions       2x20 ea        Standing clamshells SLS 2x10 ea  2x10 ea  2x10 ea 2x10 ea GTB 2x10 ea GTB 2x10 ea BTB       Toe walking with weight  10# ea hand     3x full //bar length (1lap down and back)  10# ea hand     3x full //bar length (1lap down and back)  10# ea hand     3x full //bar length (1lap down and back)  12# ea hand     3x full //bar length (1lap down and back)  12# ea hand     3x full //bar length (1lap down and back)  12# ea hand 3x full //bar length (1lap down and back)                     Ther Ex             gastroc stretch on SB 3x30"  3x30"  3x30" 3x30" Off step 2x30" single leg  Off step 2x30" single leg        Heel raises on step       3x10        DF wall lunge stretch              Slider lunges              Tennis ball press downs for posterior tib              Toe stretch on stretch board  3x30"  3x30"          X-walks              Heel raises  2 up and 1 down   2x10  2 up and 1 down   2x10  2 up and 1 down   2x10  2 up and 1 down   2x10          Soleus heel raises in standing       2x10                                  Bike---> [place] upright bike lvl 3 5 min  upright bike lvl 3 5 min  upright bike lvl 3 5 min  upright bike lvl 3 5 min  [employer] [place] 5 min  [employer] 5 min        Ther Activity             Wall squats                           Gait Training                                       Modalities

## 2022-06-10 ENCOUNTER — OFFICE VISIT (OUTPATIENT)
Dept: PHYSICAL THERAPY | Facility: CLINIC | Age: 52
End: 2022-06-10
Payer: COMMERCIAL

## 2022-06-10 DIAGNOSIS — M79.672 PAIN IN LEFT FOOT: Primary | ICD-10-CM

## 2022-06-10 PROCEDURE — 97112 NEUROMUSCULAR REEDUCATION: CPT | Performed by: PHYSICAL THERAPIST

## 2022-06-10 PROCEDURE — 97140 MANUAL THERAPY 1/> REGIONS: CPT | Performed by: PHYSICAL THERAPIST

## 2022-06-10 PROCEDURE — 97110 THERAPEUTIC EXERCISES: CPT | Performed by: PHYSICAL THERAPIST

## 2022-06-10 NOTE — PROGRESS NOTES
Daily Note     Today's date: 6/10/2022  Patient name: Juliocesar Suh  : 1970  MRN: [id number]  Referring provider: Galen Cranker, DO  Dx:   Encounter Diagnosis     ICD-10-CM    1  Pain in left foot  M79 672        Start Time: 0815  Stop Time: 0900  Total time in clinic (min): 45 minutes    Subjective: Patient notes he is doing about the same as last time  Still has more movement in his toes than he did before  Objective: See treatment diary below      Assessment: Patient tolerated treatment well  Continue with established POC  No adverse effects throughout session  Patient will be taking 2 week break from therapy and follow up with physician  Will return as needed  Patient would benefit from continued skilled physical therapy to address the impairments, improve their level of function, and to improve their overall quality of life  Plan: Continue per plan of care        Precautions: n/a    Manuals 5/3 5/6 5/26 6/1 6/3 6/7       Talocrural mobs    SK          Proximal fibular mobs              Mid foot mobs   ACL SK ACL ACL ACL       IASTM to the lateral plantar foot   ACL SK ACL ACL ACL       Neuro Re-Ed             SLS              Pebble pickup  NV 3x full cup  3x full cup  3x full cup          Short foot NV 5" hold 20x 5" hold 20x    5" hold 30x       Alternating GT -toe extensions       2x20 ea        Standing clamshells SLS 2x10 ea  2x10 ea  2x10 ea 2x10 ea GTB 2x10 ea GTB 2x10 ea BTB       Toe walking with weight  10# ea hand     3x full //bar length (1lap down and back)  10# ea hand     3x full //bar length (1lap down and back)  10# ea hand     3x full //bar length (1lap down and back)  12# ea hand     3x full //bar length (1lap down and back)  12# ea hand     3x full //bar length (1lap down and back)  12# ea hand     3x full //bar length (1lap down and back)                     Ther Ex             gastroc stretch on SB 3x30"  3x30"  3x30" 3x30" Off step 2x30" single leg Off step 2x30" single leg        Heel raises on step       3x10        DF wall lunge stretch              Slider lunges              Tennis ball press downs for posterior tib              Toe stretch on stretch board  3x30"  3x30"          X-walks              Heel raises  2 up and 1 down   2x10  2 up and 1 down   2x10  2 up and 1 down   2x10  2 up and 1 down   2x10          Soleus heel raises in standing       2x10                                  Bike---> [place] upright bike lvl 3 5 min  upright bike lvl 3 5 min  upright bike lvl 3 5 min  upright bike lvl 3 5 min  Foot Locker 5 min  Foot [employer]ocker 5 min        Ther Activity             Wall squats                           Gait Training                                       Modalities

## 2022-07-01 ENCOUNTER — TELEPHONE (OUTPATIENT)
Dept: GASTROENTEROLOGY | Facility: CLINIC | Age: 52
End: 2022-07-01

## 2022-07-01 NOTE — TELEPHONE ENCOUNTER
Pt called to josefina henderson scheduled for 07/06/2022 because he did not see it on My Chart  The appt was confirmed, he also stated he did not have the prep instructions    I emailed another set to [email or web address]

## 2022-07-06 ENCOUNTER — ANESTHESIA (OUTPATIENT)
Dept: GASTROENTEROLOGY | Facility: AMBULARY SURGERY CENTER | Age: 52
End: 2022-07-06

## 2022-07-06 ENCOUNTER — HOSPITAL ENCOUNTER (OUTPATIENT)
Dept: GASTROENTEROLOGY | Facility: AMBULARY SURGERY CENTER | Age: 52
Setting detail: OUTPATIENT SURGERY
Discharge: HOME | End: 2022-07-06
Attending: INTERNAL MEDICINE | Admitting: INTERNAL MEDICINE
Payer: COMMERCIAL

## 2022-07-06 ENCOUNTER — ANESTHESIA EVENT (OUTPATIENT)
Dept: GASTROENTEROLOGY | Facility: AMBULARY SURGERY CENTER | Age: 52
End: 2022-07-06

## 2022-07-06 VITALS
HEIGHT: 72 IN | DIASTOLIC BLOOD PRESSURE: 76 MMHG | RESPIRATION RATE: 18 BRPM | BODY MASS INDEX: 26.14 KG/M2 | SYSTOLIC BLOOD PRESSURE: 133 MMHG | OXYGEN SATURATION: 97 % | WEIGHT: 193 LBS | TEMPERATURE: 96.5 F | HEART RATE: 74 BPM

## 2022-07-06 DIAGNOSIS — Z12.11 COLON CANCER SCREENING: ICD-10-CM

## 2022-07-06 PROCEDURE — 45385 COLONOSCOPY W/LESION REMOVAL: CPT | Performed by: INTERNAL MEDICINE

## 2022-07-06 PROCEDURE — 88305 TISSUE EXAM BY PATHOLOGIST: CPT | Performed by: PATHOLOGY

## 2022-07-06 PROCEDURE — 45380 COLONOSCOPY AND BIOPSY: CPT | Performed by: INTERNAL MEDICINE

## 2022-07-06 RX ORDER — PROPOFOL 10 MG/ML
INJECTION, EMULSION INTRAVENOUS AS NEEDED
Status: DISCONTINUED | OUTPATIENT
Start: 2022-07-06 | End: 2022-07-06

## 2022-07-06 RX ORDER — SODIUM CHLORIDE, SODIUM LACTATE, POTASSIUM CHLORIDE, CALCIUM CHLORIDE 600; 310; 30; 20 MG/100ML; MG/100ML; MG/100ML; MG/100ML
INJECTION, SOLUTION INTRAVENOUS CONTINUOUS PRN
Status: DISCONTINUED | OUTPATIENT
Start: 2022-07-06 | End: 2022-07-06

## 2022-07-06 RX ADMIN — PROPOFOL 30 MG: 10 INJECTION, EMULSION INTRAVENOUS at 12:34

## 2022-07-06 RX ADMIN — PROPOFOL 20 MG: 10 INJECTION, EMULSION INTRAVENOUS at 12:20

## 2022-07-06 RX ADMIN — PROPOFOL 20 MG: 10 INJECTION, EMULSION INTRAVENOUS at 12:12

## 2022-07-06 RX ADMIN — PROPOFOL 20 MG: 10 INJECTION, EMULSION INTRAVENOUS at 12:30

## 2022-07-06 RX ADMIN — PROPOFOL 20 MG: 10 INJECTION, EMULSION INTRAVENOUS at 12:24

## 2022-07-06 RX ADMIN — PROPOFOL 100 MG: 10 INJECTION, EMULSION INTRAVENOUS at 12:08

## 2022-07-06 RX ADMIN — PROPOFOL 20 MG: 10 INJECTION, EMULSION INTRAVENOUS at 12:28

## 2022-07-06 RX ADMIN — PROPOFOL 20 MG: 10 INJECTION, EMULSION INTRAVENOUS at 12:32

## 2022-07-06 RX ADMIN — PROPOFOL 20 MG: 10 INJECTION, EMULSION INTRAVENOUS at 12:16

## 2022-07-06 RX ADMIN — PROPOFOL 20 MG: 10 INJECTION, EMULSION INTRAVENOUS at 12:18

## 2022-07-06 RX ADMIN — PROPOFOL 20 MG: 10 INJECTION, EMULSION INTRAVENOUS at 12:10

## 2022-07-06 RX ADMIN — SODIUM CHLORIDE, SODIUM LACTATE, POTASSIUM CHLORIDE, AND CALCIUM CHLORIDE: .6; .31; .03; .02 INJECTION, SOLUTION INTRAVENOUS at 12:06

## 2022-07-06 RX ADMIN — PROPOFOL 20 MG: 10 INJECTION, EMULSION INTRAVENOUS at 12:26

## 2022-07-06 RX ADMIN — Medication 40 MG: at 12:12

## 2022-07-06 RX ADMIN — PROPOFOL 50 MG: 10 INJECTION, EMULSION INTRAVENOUS at 12:14

## 2022-07-06 RX ADMIN — PROPOFOL 20 MG: 10 INJECTION, EMULSION INTRAVENOUS at 12:22

## 2022-07-06 NOTE — ANESTHESIA PREPROCEDURE EVALUATION
Procedure:  COLONOSCOPY    Stress TTE (12/2021): Interpretation Summary         Left Ventricle: Left ventricular cavity size is normal  Systolic function is normal  Wall motion is normal     Stress ECG: The stress ECG is negative for ischemia after maximal exercise, without reproduction of symptoms    Post Stress Echo: Left ventricle cavity has normal reduction in size post-stress  The left ventricle systolic function is normal post-stress    Echo Post Impression: The study is normal, after maximal exercise  TTE (12/2021): Interpretation Summary         Left Ventricle: Left ventricular cavity size is normal  The left ventricular ejection fraction is 55%  Systolic function is normal  Wall motion is normal  Diastolic function is normal for age  Wall thickness is top normal     Right Ventricle: Right ventricular cavity size is normal  Systolic function is normal     Mitral Valve: There is trace to mild regurgitation    Tricuspid Valve: There is mild regurgitation    Aorta: The aortic root is normal in size  The ascending aorta is mildly dilated (unchanged from 2018)  Relevant Problems   CARDIO   (+) Mixed hyperlipidemia   (+) Thoracic aortic aneurysm without rupture (HCC)      MUSCULOSKELETAL   (+) Primary osteoarthritis of both knees        Physical Exam    Airway    Mallampati score: I  TM Distance: >3 FB  Neck ROM: full     Dental   No notable dental hx     Cardiovascular  Rhythm: regular, Rate: normal,     Pulmonary  Breath sounds clear to auscultation,     Other Findings  Intercisor Distance > 3cm          Anesthesia Plan  ASA Score- 2     Anesthesia Type- IV sedation with anesthesia with ASA Monitors  Additional Monitors:   Airway Plan:     Comment: NPO appropriate  Discussed benefits/risks of monitored anesthetic care and discussed providing a dynamic level of mild to deep sedation   Complications include awareness, airway obstruction, aspiration which may necessitate conversion to general anesthesia  All questions answered  Patient understands and wishes to proceed          Plan Factors-Exercise tolerance (METS): >4 METS  Chart reviewed  EKG reviewed  Existing labs reviewed  Induction-     Postoperative Plan- Plan for postoperative opioid use  Informed Consent- Anesthetic plan and risks discussed with patient  I personally reviewed this patient with the CRNA  Discussed and agreed on the Anesthesia Plan with the CRNA  César Fairchild

## 2022-07-06 NOTE — ANESTHESIA POSTPROCEDURE EVALUATION
Post-Op Assessment Note    CV Status:  Stable    Pain management: adequate     Mental Status:  Alert and awake   Hydration Status:  Euvolemic   PONV Controlled:  Controlled   Airway Patency:  Patent      Post Op Vitals Reviewed: Yes            No complications documented      BP      Temp (!) 96 5 °F (35 8 °C) (07/06/22 1239)    Pulse     Resp 16 (07/06/22 1239)    SpO2 100 % (07/06/22 1239)

## 2022-07-06 NOTE — H&P
History and Physical - [facility]  Sweetie Lentz 54 y o  male MRN: [id number]                  HPI: Sweetie Lentz is a 54y o  year old male who presents for colonoscopy for colon cancer screening  REVIEW OF SYSTEMS: Per the HPI, and otherwise unremarkable  Historical Information   No past medical history on file  Past Surgical History:   Procedure Laterality Date    WISDOM TOOTH EXTRACTION       Social History   Social History     Substance and Sexual Activity   Alcohol Use Yes    Alcohol/week: 2 0 standard drinks    Types: 2 Cans of beer per week    Comment: Socially     Social History     Substance and Sexual Activity   Drug Use No     Social History     Tobacco Use   Smoking Status Former Smoker    Types: Cigars   Smokeless Tobacco Never Used     Family History   Problem Relation Age of Onset    Breast cancer Mother     Diabetes type II Father     Hypertension Father        Meds/Allergies     No current outpatient medications on file  No Known Allergies    Objective     There were no vitals taken for this visit  PHYSICAL EXAM    Gen: NAD  Head: NCAT  CV: RRR  CHEST: Clear  ABD: soft, NT/ND  EXT: no edema      ASSESSMENT/PLAN:  This is a 54y o  year old male here for colonoscopy, and he is stable and optimized for his procedure

## 2022-11-29 DIAGNOSIS — Z00.00 ENCOUNTER FOR PREVENTIVE HEALTH EXAMINATION: ICD-10-CM

## 2022-12-19 PROBLEM — Z00.00 WELL ADULT EXAM: Status: ACTIVE | Noted: 2022-12-19

## 2022-12-19 PROBLEM — I71.20 THORACIC AORTIC ANEURYSM WITHOUT RUPTURE: Status: RESOLVED | Noted: 2021-12-16 | Resolved: 2022-12-19

## 2022-12-20 ENCOUNTER — HOSPITAL ENCOUNTER (OUTPATIENT)
Dept: NON INVASIVE DIAGNOSTICS | Facility: CLINIC | Age: 52
Discharge: HOME | End: 2022-12-20

## 2022-12-20 ENCOUNTER — OFFICE VISIT (OUTPATIENT)
Dept: FAMILY MEDICINE CLINIC | Facility: CLINIC | Age: 52
End: 2022-12-20

## 2022-12-20 ENCOUNTER — HOSPITAL ENCOUNTER (OUTPATIENT)
Dept: VASCULAR ULTRASOUND | Facility: HOSPITAL | Age: 52
Discharge: HOME | End: 2022-12-20

## 2022-12-20 ENCOUNTER — APPOINTMENT (OUTPATIENT)
Dept: LAB | Facility: CLINIC | Age: 52
End: 2022-12-20

## 2022-12-20 ENCOUNTER — HOSPITAL ENCOUNTER (OUTPATIENT)
Dept: ULTRASOUND IMAGING | Facility: HOSPITAL | Age: 52
Discharge: HOME | End: 2022-12-20

## 2022-12-20 VITALS
DIASTOLIC BLOOD PRESSURE: 84 MMHG | HEIGHT: 72 IN | BODY MASS INDEX: 27.02 KG/M2 | WEIGHT: 199.52 LBS | SYSTOLIC BLOOD PRESSURE: 136 MMHG | HEART RATE: 65 BPM

## 2022-12-20 VITALS
RESPIRATION RATE: 14 BRPM | HEIGHT: 72 IN | SYSTOLIC BLOOD PRESSURE: 136 MMHG | WEIGHT: 199.6 LBS | HEART RATE: 64 BPM | BODY MASS INDEX: 27.04 KG/M2 | DIASTOLIC BLOOD PRESSURE: 84 MMHG

## 2022-12-20 DIAGNOSIS — I77.810 ASCENDING AORTA DILATION (HCC): ICD-10-CM

## 2022-12-20 DIAGNOSIS — Z00.00 ENCOUNTER FOR PREVENTIVE HEALTH EXAMINATION: ICD-10-CM

## 2022-12-20 DIAGNOSIS — Z00.00 WELL ADULT EXAM: Primary | ICD-10-CM

## 2022-12-20 DIAGNOSIS — R73.09 ELEVATED GLUCOSE: ICD-10-CM

## 2022-12-20 DIAGNOSIS — E55.9 VITAMIN D DEFICIENCY: ICD-10-CM

## 2022-12-20 DIAGNOSIS — E78.2 MIXED HYPERLIPIDEMIA: ICD-10-CM

## 2022-12-20 DIAGNOSIS — R03.0 ELEVATED BLOOD PRESSURE READING: ICD-10-CM

## 2022-12-20 DIAGNOSIS — R31.29 MICROSCOPIC HEMATURIA: ICD-10-CM

## 2022-12-20 LAB
25(OH)D3 SERPL-MCNC: 23.3 NG/ML (ref 30–100)
ALBUMIN SERPL BCP-MCNC: 4.7 G/DL (ref 3.5–5)
ALP SERPL-CCNC: 45 U/L (ref 34–104)
ALT SERPL W P-5'-P-CCNC: 21 U/L (ref 7–52)
ANION GAP SERPL CALCULATED.3IONS-SCNC: 7 MMOL/L (ref 4–13)
AORTIC ROOT: 3.1 CM
APICAL FOUR CHAMBER EJECTION FRACTION: 56 %
ASCENDING AORTA: 3.4 CM
AST SERPL W P-5'-P-CCNC: 20 U/L (ref 13–39)
ATRIAL RATE: 64 BPM
BACTERIA UR QL AUTO: ABNORMAL /HPF
BASELINE ST DEPRESSION: 0 MM
BASOPHILS # BLD AUTO: 0.05 THOUSANDS/ÂΜL (ref 0–0.1)
BASOPHILS NFR BLD AUTO: 1 % (ref 0–1)
BILIRUB SERPL-MCNC: 1.04 MG/DL (ref 0.2–1)
BILIRUB UR QL STRIP: NEGATIVE
BUN SERPL-MCNC: 17 MG/DL (ref 5–25)
CALCIUM SERPL-MCNC: 8.9 MG/DL (ref 8.4–10.2)
CHLORIDE SERPL-SCNC: 105 MMOL/L (ref 96–108)
CHOLEST SERPL-MCNC: 225 MG/DL
CLARITY UR: CLEAR
CO2 SERPL-SCNC: 29 MMOL/L (ref 21–32)
COLOR UR: ABNORMAL
CREAT SERPL-MCNC: 1.17 MG/DL (ref 0.6–1.3)
CRP SERPL HS-MCNC: 1.64 MG/L
E WAVE DECELERATION TIME: 176 MS
EOSINOPHIL # BLD AUTO: 0.11 THOUSAND/ÂΜL (ref 0–0.61)
EOSINOPHIL NFR BLD AUTO: 3 % (ref 0–6)
ERYTHROCYTE [DISTWIDTH] IN BLOOD BY AUTOMATED COUNT: 13.3 % (ref 11.6–15.1)
EST. AVERAGE GLUCOSE BLD GHB EST-MCNC: 123 MG/DL
FRACTIONAL SHORTENING: 31 % (ref 28–44)
GFR SERPL CREATININE-BSD FRML MDRD: 72 ML/MIN/1.73SQ M
GLUCOSE P FAST SERPL-MCNC: 100 MG/DL (ref 65–99)
GLUCOSE UR STRIP-MCNC: NEGATIVE MG/DL
HBA1C MFR BLD: 5.9 %
HCT VFR BLD AUTO: 46.2 % (ref 36.5–49.3)
HDLC SERPL-MCNC: 46 MG/DL
HGB BLD-MCNC: 15.1 G/DL (ref 12–17)
HGB UR QL STRIP.AUTO: ABNORMAL
IMM GRANULOCYTES # BLD AUTO: 0.01 THOUSAND/UL (ref 0–0.2)
IMM GRANULOCYTES NFR BLD AUTO: 0 % (ref 0–2)
INTERVENTRICULAR SEPTUM IN DIASTOLE (PARASTERNAL SHORT AXIS VIEW): 1 CM
INTERVENTRICULAR SEPTUM: 1 CM (ref 0.6–1.1)
KETONES UR STRIP-MCNC: NEGATIVE MG/DL
LAAS-AP2: 24.9 CM2
LAAS-AP4: 20.3 CM2
LDLC SERPL CALC-MCNC: 158 MG/DL (ref 0–100)
LEFT ATRIUM AREA SYSTOLE SINGLE PLANE A4C: 22 CM2
LEFT ATRIUM SIZE: 3.9 CM
LEFT INTERNAL DIMENSION IN SYSTOLE: 2.7 CM (ref 2.1–4)
LEFT VENTRICULAR INTERNAL DIMENSION IN DIASTOLE: 3.9 CM (ref 3.5–6)
LEFT VENTRICULAR POSTERIOR WALL IN END DIASTOLE: 0.9 CM
LEFT VENTRICULAR STROKE VOLUME: 40 ML
LEUKOCYTE ESTERASE UR QL STRIP: NEGATIVE
LVSV (TEICH): 40 ML
LYMPHOCYTES # BLD AUTO: 1.57 THOUSANDS/ÂΜL (ref 0.6–4.47)
LYMPHOCYTES NFR BLD AUTO: 38 % (ref 14–44)
MAX HR PERCENT: 95 %
MAX HR: 162 BPM
MCH RBC QN AUTO: 30.8 PG (ref 26.8–34.3)
MCHC RBC AUTO-ENTMCNC: 32.7 G/DL (ref 31.4–37.4)
MCV RBC AUTO: 94 FL (ref 82–98)
MONOCYTES # BLD AUTO: 0.36 THOUSAND/ÂΜL (ref 0.17–1.22)
MONOCYTES NFR BLD AUTO: 9 % (ref 4–12)
MV E'TISSUE VEL-SEP: 10 CM/S
MV PEAK A VEL: 0.85 M/S
MV PEAK E VEL: 68 CM/S
MV STENOSIS PRESSURE HALF TIME: 51 MS
MV VALVE AREA P 1/2 METHOD: 4.31 CM2
NEUTROPHILS # BLD AUTO: 2.06 THOUSANDS/ÂΜL (ref 1.85–7.62)
NEUTS SEG NFR BLD AUTO: 49 % (ref 43–75)
NITRITE UR QL STRIP: NEGATIVE
NON-SQ EPI CELLS URNS QL MICRO: ABNORMAL /HPF
NRBC BLD AUTO-RTO: 0 /100 WBCS
P AXIS: 28 DEGREES
PA SYSTOLIC PRESSURE: 22 MMHG
PH UR STRIP.AUTO: 5 [PH]
PLATELET # BLD AUTO: 212 THOUSANDS/UL (ref 149–390)
PMV BLD AUTO: 10.7 FL (ref 8.9–12.7)
POST LVEF: 70 %
POTASSIUM SERPL-SCNC: 3.9 MMOL/L (ref 3.5–5.3)
PR INTERVAL: 162 MS
PROT SERPL-MCNC: 7.4 G/DL (ref 6.4–8.4)
PROT UR STRIP-MCNC: NEGATIVE MG/DL
QRS AXIS: 23 DEGREES
QRSD INTERVAL: 86 MS
QT INTERVAL: 426 MS
QTC INTERVAL: 439 MS
RATE PRESSURE PRODUCT: NORMAL
RBC # BLD AUTO: 4.91 MILLION/UL (ref 3.88–5.62)
RBC #/AREA URNS AUTO: ABNORMAL /HPF
RIGHT ATRIUM AREA SYSTOLE A4C: 12.1 CM2
RIGHT VENTRICLE ID DIMENSION: 3.3 CM
SL CV LEFT ATRIUM LENGTH A2C: 0 CM
SL CV LV EF: 60
SL CV LV EF: 60
SL CV PED ECHO LEFT VENTRICLE DIASTOLIC VOLUME (MOD BIPLANE) 2D: 67 ML
SL CV PED ECHO LEFT VENTRICLE SYSTOLIC VOLUME (MOD BIPLANE) 2D: 28 ML
SL CV STRESS RECOVERY BP: NORMAL MMHG
SL CV STRESS RECOVERY HR: 94 BPM
SL CV STRESS STAGE REACHED: 5
SODIUM SERPL-SCNC: 141 MMOL/L (ref 135–147)
SP GR UR STRIP.AUTO: 1.02 (ref 1–1.03)
STRESS ANGINA INDEX: 0
STRESS BASELINE BP: NORMAL MMHG
STRESS BASELINE HR: 66 BPM
STRESS DUKE TREADMILL SCORE: 13
STRESS O2 SAT REST: 98 %
STRESS PEAK HR: 162 BPM
STRESS POST ESTIMATED WORKLOAD: 15.3 METS
STRESS POST EXERCISE DUR MIN: 13 MIN
STRESS POST O2 SAT PEAK: 97 %
STRESS POST PEAK BP: 180 MMHG
STRESS ST DEPRESSION: 0 MM
T WAVE AXIS: 20 DEGREES
TR MAX PG: 19 MMHG
TR PEAK VELOCITY: 2.2 M/S
TRICUSPID VALVE PEAK REGURGITATION VELOCITY: 2.16 M/S
TRIGL SERPL-MCNC: 104 MG/DL
TSH SERPL DL<=0.05 MIU/L-ACNC: 0.69 UIU/ML (ref 0.45–4.5)
UROBILINOGEN UR STRIP-ACNC: <2 MG/DL
VENTRICULAR RATE: 64 BPM
WBC # BLD AUTO: 4.16 THOUSAND/UL (ref 4.31–10.16)
WBC #/AREA URNS AUTO: ABNORMAL /HPF

## 2022-12-20 NOTE — ASSESSMENT & PLAN NOTE
Your blood sugar is mildly elevated today at 100, and your long-term blood sugar (A1c) is also mildly elevated at 5 9%  This is similar to your results from last year  You are still in the " prediabetic" category  I would recommend continuing to work on carbohydrate reduction (sweets, bread, pasta, potatoes)  Also increasing exercise  We should repeat these labs again in 1 year

## 2022-12-20 NOTE — PROGRESS NOTES
Heart and Vascular Summary:     Baseline ECG:   Normal sinus rhythm, normal ECG          Echocardiogram: Normal right and left ventricular size and function  Left ventricular ejection fraction (EF) was 60%  No significant valvular abnormalities  This was a normal study  Stress Echocardiogram: Excellent exercise capacity (13 mins), achieving 15 3 METS, and 95% of maximal predicted heart rate  You had no changes on the ECG portion to suggest any ischemia  Blood pressure was normal at the start of the test, with a normal response to exercise (peak blood pressure of 355 systolic)  You had a good heart rate recovery after exercise  Normal baseline left and right ventricular wall motion and function on echocardiogram with no significant wall motion abnormalities  This suggests that there are no significant blockages in your coronary arteries over 50% that would require revascularization  Lipid Profile: this revealed total cholesterol of 225, and LDL of 158 HDL of 46 and Triglyceride level of 104  The total cholesterol is a sum of its individual parts  The HDL is the good cholesterol, which is protective to your heart  Your level of 46 is good  The Triglycerides are a marker of your diet and genetics  Less than 150 is what we aim for, and your level is well controlled  The LDL is the bad cholesterol, the cholesterol that builds atherosclerotic plaque in your arteries  The level of 158 is elevated compared to your goal of less than 120  ASCVD Risk : your 10-year cardiovascular risk for an event is 5%, which is borderline, with optimal of 2 1% in age and gender matched individuals  Continue and ramp up active healthy lifestyle measures to maintain cardiovascular health and improve blood pressure and cholesterol values

## 2022-12-20 NOTE — PROGRESS NOTES
[facility] Physical Exam     Elizabeth Richards is a 50 y o  male who is presenting for his 5th [facility] Physical Exam at [address]  Mr Valente Kc is the  at [facility]      His past medical history is significant for hyperlipidemia, elevated blood sugar, vitamin D deficiency,  Microhematuria, and mildly dilated ascending thoracic aorta  He does not take any prescription medications, but does take OTC vitamin D3 1000 IU daily  His past surgical history is negative  Family history is positive for diabetes and hypertension on fathers side  Breast cancer on mothers side  Oleg Rodriguez is a non-smoker (prior history of occasional cigar  smoking)  He rarely drinks alcohol  He drinks approximately 4-5 cups of coffee per day  He considers his diet mostly healthy, watching carbs and fats in his diet   He exercises approximately 4-5 days per week (elliptical, treadmill, Nautilus equipment)  He is  with 3 children; a 24-year-old daughter, and 15year-old twins (boy and girl)  He also has 3 stepchildren (ages 45, 32, and 25)  He does not have any specific concerns today  Review of Systems   Constitutional: Negative for chills and fever  HENT: Negative for ear pain and sore throat  Eyes: Negative for pain and visual disturbance  Respiratory: Negative for cough and shortness of breath  Cardiovascular: Negative for chest pain and palpitations  Gastrointestinal: Negative for abdominal pain and vomiting  Genitourinary: Negative for dysuria and hematuria  Musculoskeletal: Negative for arthralgias and back pain  Skin: Negative for color change and rash  Neurological: Negative for seizures and syncope  All other systems reviewed and are negative          Active Ambulatory Problems     Diagnosis Date Noted   • Vitamin D deficiency 03/29/2018   • Mixed hyperlipidemia 03/29/2018   • Microscopic hematuria 03/29/2018   • Elevated glucose 03/29/2018   • Primary osteoarthritis of both knees 12/16/2021   • Plantar fasciitis 04/09/2022   • Pain in left foot 04/09/2022   • Well adult exam 12/19/2022   • Ascending aorta dilation ([address] ) 12/20/2022   • Elevated blood pressure reading 12/20/2022     Resolved Ambulatory Problems     Diagnosis Date Noted   • Thoracic aortic aneurysm without rupture 12/16/2021     No Additional Past Medical History       Past Surgical History:   Procedure Laterality Date   • WISDOM TOOTH EXTRACTION         Family History   Problem Relation Age of Onset   • Breast cancer Mother    • Diabetes type II Father    • Hypertension Father        Social History     Tobacco Use   Smoking Status Former   • Types: Cigars   Smokeless Tobacco Never       No current outpatient medications on file  No Known Allergies      Objective:    Vitals:    12/20/22 1000   BP: 136/84   Pulse: 64   Resp: 14   Weight: 90 5 kg (199 lb 9 6 oz)   Height: 5' 11 5" (1 816 m)        Physical Exam  Constitutional:       Appearance: Normal appearance  HENT:      Head: Normocephalic and atraumatic  Right Ear: Tympanic membrane, ear canal and external ear normal  There is no impacted cerumen  Left Ear: Tympanic membrane, ear canal and external ear normal  There is no impacted cerumen  Nose: Nose normal       Mouth/Throat:      Mouth: Mucous membranes are moist       Pharynx: Oropharynx is clear  No posterior oropharyngeal erythema  Eyes:      Extraocular Movements: Extraocular movements intact  Conjunctiva/sclera: Conjunctivae normal       Pupils: Pupils are equal, round, and reactive to light  Neck:      Vascular: No carotid bruit  Cardiovascular:      Rate and Rhythm: Normal rate and regular rhythm  Pulses: Normal pulses  Heart sounds: Normal heart sounds  No murmur heard  Pulmonary:      Effort: Pulmonary effort is normal       Breath sounds: Normal breath sounds  Abdominal:      General: Abdomen is flat  Bowel sounds are normal  There is no distension  Palpations: Abdomen is soft  There is no mass  Tenderness: There is no abdominal tenderness  Hernia: No hernia is present  Genitourinary:     Rectum: Guaiac result positive  Comments: FRANCINE: declined  Musculoskeletal:         General: Normal range of motion  Cervical back: Normal range of motion and neck supple  Lymphadenopathy:      Cervical: No cervical adenopathy  Skin:     General: Skin is warm  Capillary Refill: Capillary refill takes less than 2 seconds  Coloration: Skin is not jaundiced  Findings: No rash  Neurological:      General: No focal deficit present  Mental Status: He is alert and oriented to person, place, and time  Cranial Nerves: No cranial nerve deficit  Motor: No weakness  Gait: Gait normal    Psychiatric:         Mood and Affect: Mood normal          Behavior: Behavior normal          Thought Content: Thought content normal          Judgment: Judgment normal              Assessment/Plan:       Here are the findings from today's exam:(also see cardiology and dermatology reports)          1  Well adult exam  Assessment & Plan:  I think that overall you are in pretty good health at this time  That being said, I do think there are some issues that you should work on  (Please refer to  Individually listed diagnoses, cardiology, and Dermatology reports for more detail)  You had a colonoscopy done July 2022 showing a few polyps  You were advised to repeat colonoscopy again in 2025  I am glad you received your COVID vaccination  I would also recommend the following vaccinations for you in the near future  (influenza , tetanus booster, and Shingrix)  Should be able to get these at your PCPs office  Lastly, I would recommend continuing a healthy diet and regular exercise program      2  Mixed hyperlipidemia  Assessment & Plan: Your cholesterol today is still high at 225    Your bad cholesterol/LDL is also elevated at 158  Your ASCVD risk score (risk of you having a cardiovascular event in the next 10-years) is borderline at 4 9%  I think it would be reasonable to continue to work on reducing animal-based fats in your diet along with regular exercise  I would recommend repeating these labs again in 1 year  3  Elevated blood pressure reading  Assessment & Plan: Your blood pressures today were somewhat suboptimal (136/84 upon arrival, 130/100 during stress test)  Goal blood pressure is below 130/80  I would recommend decreasing salt in your diet  Increasing exercise and approximately 10 pound weight loss  4  Elevated glucose  Assessment & Plan: Your blood sugar is mildly elevated today at 100, and your long-term blood sugar (A1c) is also mildly elevated at 5 9%  This is similar to your results from last year  You are still in the " prediabetic" category  I would recommend continuing to work on carbohydrate reduction (sweets, bread, pasta, potatoes)  Also increasing exercise  We should repeat these labs again in 1 year  5  Vitamin D deficiency  Assessment & Plan: Your vitamin D level has improved a little since last year (now 23 3, was 19 9)  Unfortunately, your level is still too low  I would recommend increasing your vitamin D supplement to 5000 international units daily for 1 month, and then  2000 international units daily (long term)  Recommend repeating labs again in 1 year  6  Microscopic hematuria  Assessment & Plan:  You have a history of asymptomatic microscopic hematuria (blood in your urine) for the past few years  Blood in the urine can have many possible causes (both benign and otherwise)  Fortunately you are not having any urinary tract symptoms, but I would still recommend evaluation by a urologist   I will place another referral order for you today  We can assist you in scheduling this if you would like      Orders:  -     Ambulatory Referral to Urology; Future    7  Ascending aorta dilation [facility])  Assessment & Plan: Your echocardiogram this year did not demonstrate any enlargement of your aorta  I think it is reasonable to monitor this with yearly echocardiograms        [facility],    Thank you for again choosing [facility] at the [facility]  It was a pleasure meeting and getting to know you today  If you have any questions regarding today's exam, feel free to contact me  We hope to see you again in the future  Levon Salcedo ([id number]  Physician)  [phone] (cell)  [email or web address]

## 2022-12-20 NOTE — ASSESSMENT & PLAN NOTE
Your cholesterol today is still high at 225  Your bad cholesterol/LDL is also elevated at 158  Your ASCVD risk score (risk of you having a cardiovascular event in the next 10-years) is borderline at 4 9%  I think it would be reasonable to continue to work on reducing animal-based fats in your diet along with regular exercise  I would recommend repeating these labs again in 1 year

## 2022-12-20 NOTE — ASSESSMENT & PLAN NOTE
I think that overall you are in pretty good health at this time  That being said, I do think there are some issues that you should work on  (Please refer to  Individually listed diagnoses, cardiology, and Dermatology reports for more detail)  You had a colonoscopy done July 2022 showing a few polyps  You were advised to repeat colonoscopy again in 2025  I am glad you received your COVID vaccination  I would also recommend the following vaccinations for you in the near future  (influenza , tetanus booster, and Shingrix)  Should be able to get these at your PCPs office    Lastly, I would recommend continuing a healthy diet and regular exercise program

## 2022-12-20 NOTE — PROGRESS NOTES
Nutritional Summary and Recommendations:     Patient Nutrition-Oriented Medical/Diet Hx  Timi Sample presents today to [facility] for nutrition assessment and counseling  No significant changes to nutrition since previous visit  Discussion focused on eating more throughout the day, increasing protein intake, and improving vitamin D and cholesterol/A1C levels  Labs:  A1C 5 9  Total Cholesterol 225  Triglycerides 104  HDL 46    Vitamin D23 3    Anthropometrics:     Ht: 6 ft 0 in   Wt:  197 2 lb  BMI: 26 7     BMR: 1877 kcals  Fat%: 23 3%  Acceptable Range: 11-22%     Fat Mass: 46 lb FFM: 151 2 lb  TBW: 110 6 lb       Nutrition Diagnosis:  Altered nutrition related laboratory values r/t physiological issues as evidenced by A1C 5 9 and Vitamin D 23 3        Nutrition Interventions:  Plan and prepare well-balanced meals and snacks ahead of time to bring to work  Eat within 1 hour of working out  Eat more throughout the day to avoid overeating at night  Aim for  g protein daily    Nutrition Recommendations:    1  Achieve LDL <158 and HgbA1C < 5 9 by next encounter  2  Take 2000 IU Vitamin D3 daily to improve vitamin D within 6 to 12 months  3  Improve body composition by next visit  4  Contact RD with any questions or concerns       Nutrition Education     Healthy Food Prep and Healthy Snacking       Perceived Comprehension:  Good     Expected Compliance:  Good

## 2022-12-20 NOTE — ASSESSMENT & PLAN NOTE
You have a history of asymptomatic microscopic hematuria (blood in your urine) for the past few years  Blood in the urine can have many possible causes (both benign and otherwise)  Fortunately you are not having any urinary tract symptoms, but I would still recommend evaluation by a urologist   I will place another referral order for you today  We can assist you in scheduling this if you would like

## 2022-12-20 NOTE — PROGRESS NOTES
Hearing Assessment Summary:    Hearing Screening    250Hz 500Hz 1000Hz 2000Hz 3000Hz 4000Hz 6000Hz 8000Hz   Right ear 15 15 15 10 20 20 20 5   Left ear 10 10 10 5 20 20 15 5   Comments: HEARING EVALUATION    Name:  Shreya Nova  :  1970  Age:  50 y o  Date of Evaluation: 22     History: [facility] Exam  Reason for visit: Shreya Nova is being seen today for an evaluation of hearing as part of an [employer] examination  Patient reports no concern over hearing, and denies tinnitus, ear pain and dizziness  EVALUATION:    Otoscopic Evaluation:   Right Ear: Clear and healthy ear canal and tympanic membrane   Left Ear: Clear and healthy ear canal and tympanic membrane    Tympanometry:   Right: Type A - normal middle ear pressure and compliance   Left: Type A - normal middle ear pressure and compliance    Audiogram Results:  Normal peripheral hearing sensitivity bilaterally  *see attached audiogram      RECOMMENDATIONS:  Annual hearing eval, Return to [facility]  for F/U, Hearing Protection, and Copy to Patient/Caregiver    PATIENT EDUCATION:   Discussed results and recommendations with patient  Questions were addressed and the patient was encouraged to contact our department should concerns arise        Margo Hernandez   Clinical Audiologist        Vision Screening    Right eye Left eye Both eyes   Without correction      With correction 20/15 20/20 20/13

## 2022-12-20 NOTE — PROGRESS NOTES
Fitness Summary and Recommendations:  Yue Jordan scored at the 45% on his body composition assessment with a bodyfat % of 23 3%  Yue Jordan scored in the average range for flexibility with a sit & reach score of 23 cm  His Muscle Strength/Endurance scores placed him at the 55% (lower body) and 95% (upper body) with a chair stand score of 24 and arm curl score of 31 respectively  Donaldo’s Cardiovascular Score of 63 2 placed him at the 95%  Overall, Yue Jordan would be considered to have an above average fitness level with a 69% score  His overall fitness score has increased by 9% from his previous test on 12/16/21  Continued emphasis on regular exercise and sound nutrition practices will enable Yue Jordan to continue his optimal level of fitness

## 2022-12-20 NOTE — ASSESSMENT & PLAN NOTE
Your blood pressures today were somewhat suboptimal (136/84 upon arrival, 130/100 during stress test)  Goal blood pressure is below 130/80  I would recommend decreasing salt in your diet  Increasing exercise and approximately 10 pound weight loss

## 2022-12-20 NOTE — ASSESSMENT & PLAN NOTE
Your echocardiogram this year did not demonstrate any enlargement of your aorta    I think it is reasonable to monitor this with yearly echocardiograms

## 2022-12-20 NOTE — PROGRESS NOTES
Your [address] [place] [address] Dermatologist's Name: Radha Friedman MD    Skin Screening Exam:    A chaperone was present throughout the entire encounter  SKIN:  FULL ORGAN SYSTEM EXAM   Hair, Scalp, Ears, Face Normal except as noted below in Assessment   Neck, Cervical Chain Nodes Normal except as noted below in Assessment   Right Arm/Hand/Fingers Normal except as noted below in Assessment   Left Arm/Hand/Fingers Normal except as noted below in Assessment   Chest/Breasts/Axillae • Did the patient specifically refuse to have the areas "under-the-bra" examined by the Dermatologist? No  Examined areas normal except as noted below in Assessment   Abdomen, Umbilicus Normal except as noted below in Assessment   Back/Spine Normal except as noted below in Assessment   Groin/Genitalia/Buttocks • Did the patient specifically refuse to have the areas "under-the-underwear" examined by the Dermatologist? No  Examined areas normal except as noted below in Assessment   Right Leg, Foot, Toes Normal except as noted below in Assessment   Left Leg, Foot, Toes Normal except as noted below in Assessment        Assessment and Plan by Diagnosis:    • Use a moisturizer + sunscreen "combo" product such as Neutrogena Daily Defense SPF 50+ or CeraVe AM at least three times a day  • Follow-up with your private dermatologist or one of our board-certified [place] [id number] Dermatologists as discussed  • [facility]'s own Dr Xenia Grady is available for consultation for skin enhancement and revitalization services; please mention "[facility]" for expedited scheduling      MELANOCYTIC NEVI ("Moles")    Physical Exam:  • Anatomic Location Affected:   Mostly on sun-exposed areas of the trunk and extremities  • Morphological Description:  Scattered, 1-4mm round to ovoid, symmetric and evenly bordered, regularly pigmented macules/papules without outliers other than if noted elsewhere in today's note    • Pertinent Positives:  • Pertinent Negatives: Additional History of Present Condition:      Assessment and Plan:  Based on a thorough discussion of this condition and the management approach to it (including a comprehensive discussion of the known risks, side effects and potential benefits of treatment), the patient (family) agrees to implement the following specific plan:  • The patient was encouraged to use an SPF30+ broad spectrum sunscreen daily and re-apply every 2-3 outdoors while outside  The importance of sun protection, self-skin exams, and sun avoidance was emphasized  An annual full body skin exam is recommended, and the patient was encouraged to return to the office sooner for any new or changing lesions of concerns  • Benign, reassured  • Annual skin check     Melanocytic Nevi  Melanocytic nevi ("moles") are tan or brown, raised or flat areas of the skin which have an increased number of melanocytes  Melanocytes are the cells in our body which make pigment and account for skin color  Some moles are present at birth (I e , "congenital nevi"), while others come up later in life (i e , "acquired nevi")  The sun can stimulate the body to make more moles  Sunburns are not the only thing that triggers more moles  Chronic sun exposure can do it too  Clinically distinguishing a healthy mole from melanoma may be difficult, even for experienced dermatologists  The "ABCDE's" of moles have been suggested as a means of helping to alert a person to a suspicious mole and the possible increased risk of melanoma  The suggestions for raising alert are as follows:    Asymmetry: Healthy moles tend to be symmetric, while melanomas are often asymmetric  Asymmetry means if you draw a line through the mole, the two halves do not match in color, size, shape, or surface texture   Asymmetry can be a result of rapid enlargement of a mole, the development of a raised area on a previously flat lesion, scaling, ulceration, bleeding or scabbing within the mole  Any mole that starts to demonstrate "asymmetry" should be examined promptly by a board certified dermatologist      Border: Healthy moles tend to have discrete, even borders  The border of a melanoma often blends into the normal skin and does not sharply delineate the mole from normal skin  Any mole that starts to demonstrate "uneven borders" should be examined promptly by a board certified dermatologist      Color: Healthy moles tend to be one color throughout  Melanomas tend to be made up of different colors ranging from dark black, blue, white, or red  Any mole that demonstrates a color change should be examined promptly by a board certified dermatologist      Diameter: Healthy moles tend to be smaller than 0 6 cm in size; an exception are "congenital nevi" that can be larger  Melanomas tend to grow and can often be greater than 0 6 cm (1/4 of an inch, or the size of a pencil eraser)  This is only a guideline, and many normal moles may be larger than 0 6 cm without being unhealthy  Any mole that starts to change in size (small to bigger or bigger to smaller) should be examined promptly by a board certified dermatologist      Evolving: Healthy moles tend to "stay the same "  Melanomas may often show signs of change or evolution such as a change in size, shape, color, or elevation  Any mole that starts to itch, bleed, crust, burn, hurt, or ulcerate or demonstrate a change or evolution should be examined promptly by a board certified dermatologist         LENTIGO    Physical Exam:  • Anatomic Location Affected:  Sun exposed skin of head and extremities  • Morphological Description:  Light brown well demarcated macules on sun exposed skin with reassuring dermoscopy  • Pertinent Positives:  • Pertinent Negatives:     Additional History of Present Condition:      Assessment and Plan:  Based on a thorough discussion of this condition and the management approach to it (including a comprehensive discussion of the known risks, side effects and potential benefits of treatment), the patient (family) agrees to implement the following specific plan:  • When outside we recommend using a wide brim hat, sunglasses, long sleeve and pants, sunscreen with SPF 32+ with reapplication every 2 hours, or SPF specific clothing       What is a lentigo? A lentigo is a pigmented flat or slightly raised lesion with a clearly defined edge  Unlike an ephelis (freckle), it does not fade in the winter months  There are several kinds of lentigo  The name lentigo originally referred to its appearance resembling a small lentil  The plural of lentigo is lentigines, although “lentigos” is also in common use  Who gets lentigines? Lentigines can affect males and females of all ages and races  Solar lentigines are especially prevalent in fair skinned adults  Lentigines associated with syndromes are present at birth or arise during childhood  What causes lentigines? Common forms of lentigo are due to exposure to ultraviolet radiation:  • Sun damage including sunburn   • Indoor tanning   • Phototherapy, especially photochemotherapy (PUVA)    Ionizing radiation, eg radiation therapy, can also cause lentigines  Several familial syndromes associated with widespread lentigines originate from mutations in Mat-MAP kinase, mTOR signaling and PTEN pathways  What is the treatment for lentigines? Most lentigines are left alone  Attempts to lighten them may not be successful  The following approaches are used:  • SPF 50+ broad-spectrum sunscreen   • Hydroquinone bleaching cream   • Alpha hydroxy acids   • Vitamin C   • Retinoids   • Azelaic acid   • Chemical peels  Individual lesions can be permanently removed using:  • Cryotherapy   • Intense pulsed light   • Pigment lasers    How can lentigines be prevented?   Lentigines associated with exposure ultraviolet radiation can be prevented by very careful sun protection  Clothing is more successful at preventing new lentigines than are sunscreens  What is the outlook for lentigines? Lentigines usually persist  They may increase in number with age and sun exposure  Some in sun-protected sites may fade and disappear  FLORES ANGIOMAS    Physical Exam:  • Anatomic Location Affected:  trunk  • Morphological Description:  Scattered cherry red, variably sized papules  • Pertinent Positives:  • Pertinent Negatives: Additional History of Present Condition:      Assessment and Plan:  Based on a thorough discussion of this condition and the management approach to it (including a comprehensive discussion of the known risks, side effects and potential benefits of treatment), the patient (family) agrees to implement the following specific plan:  • Monitor for changes  • Benign, reassured  •     Assessment and Plan:    Cherry angioma, also known as Ching Patel spots, are benign vascular skin lesions  A "cherry angioma" is a firm red, blue or purple papule, 0 1-1 cm in diameter  When thrombosed, they can appear black in colour until evaluated with a dermatoscope when the red or purple colour is more easily seen  Cherry angioma may develop on any part of the body but most often appear on the scalp, face, lips and trunk  An angioma is due to proliferating endothelial cells; these are the cells that line the inside of a blood vessel  Angiomas can arise in early life or later in life; the most common type of angioma is a cherry angioma  Cherry angiomas are very common in males and females of any age or race  They are more noticeable in white skin than in skin of colour  They markedly increase in number from about the age of 36  There may be a family history of similar lesions  Eruptive cherry angiomas have been rarely reported to be associated with internal malignancy  The cause of angiomas is unknown   Genetic analysis of cherry angiomas has shown that they frequently carry specific somatic missense mutations in the GNAQ and GNA11 (Q209H) genes, which are involved in other vascular and melanocytic proliferations  SEBORRHEIC KERATOSIS; NON-INFLAMED    Physical Exam:  • Anatomic Location Affected:  trunk  • Morphological Description:  Brown waxy variably sized "stuck-on" appearing papules with reassuring dermoscopy  • Pertinent Positives:  • Pertinent Negatives: Additional History of Present Condition:      Assessment and Plan:  Based on a thorough discussion of this condition and the management approach to it (including a comprehensive discussion of the known risks, side effects and potential benefits of treatment), the patient (family) agrees to implement the following specific plan:  • Monitor for changes  • Benign, reassured  •     Seborrheic Keratosis  A seborrheic keratosis is a harmless warty spot that appears during adult life as a common sign of skin aging  Seborrheic keratoses can arise on any area of skin, covered or uncovered, with the usual exception of the palms and soles  They do not arise from mucous membranes  Seborrheic keratoses can have highly variable appearance  Seborrheic keratoses are extremely common  It has been estimated that over 90% of adults over the age of 61 years have one or more of them  They occur in males and females of all races, typically beginning to erupt in the 35s or 45s  They are uncommon under the age of 21 years  The precise cause of seborrhoeic keratoses is not known  Seborrhoeic keratoses are considered degenerative in nature  As time goes by, seborrheic keratoses tend to become more numerous  Some people inherit a tendency to develop a very large number of them; some people may have hundreds of them  There is no easy way to remove multiple lesions on a single occasion    Unless a specific lesion is "inflamed" and is causing pain or stinging/burning or is bleeding, most insurance companies do not authorize treatment

## 2022-12-21 LAB — PSA SERPL-MCNC: 1.1 NG/ML (ref 0–4)

## 2022-12-29 NOTE — ASSESSMENT & PLAN NOTE
Postponing this encounter-    Patient due for Prolia injection around 6/29/2023    1. RN- Please ask provider to put in a new CAM order for (Pre Auth) Prolia injection if one is not current for the calendar year and any lab orders if needed (it is up to the provider's discretion on how often labs are checked once Prolia injections have been started)    2. Team- Please contact patient to schedule the injection with RN 2 weeks out    Make sure patient has NOT had any dental work involving the jaw bone (i.e teeth extraction) 1 month prior to injection.   Schedule 1 month out from any dental procedure involving the jaw bone.    For questions about the cost, patient may contact our CONSUMER PRICE LINE at [phone] for an estimate.     CHECK STATUS PRIOR TO ADMINISTRATION- Chart Review > Referrals tab - Select the Referral to view the report    Mary Spears RN  [facility]     Your vitamin D level has improved a little since last year (now 23 3, was 19 9)  Unfortunately, your level is still too low  I would recommend increasing your vitamin D supplement to 5000 international units daily for 1 month, and then  2000 international units daily (long term)  Recommend repeating labs again in 1 year

## 2023-02-07 ENCOUNTER — TELEPHONE (OUTPATIENT)
Dept: UROLOGY | Facility: AMBULATORY SURGERY CENTER | Age: 53
End: 2023-02-07

## 2023-02-07 NOTE — TELEPHONE ENCOUNTER
New Patient    What is the reason for the patient’s appointment? Microscopic hematuria    What office location does the patient prefer? [place] [place]     Imaging/Lab Results:    Do we accept the patient's insurance or is the patient Self-Pay? Yes     Insurance Provider: [employer]  Plan Type/Number:  Member ID#: Has the patient had any previous Urologist(s)? No     Have patient records been requested? If not are records showing in [address] [place] [address] Rd: records in [facility]    Has the patient had any outside testing done? No     Does the patient have a personal history of cancer?  No

## 2023-02-17 PROBLEM — Z00.00 WELL ADULT EXAM: Status: RESOLVED | Noted: 2022-12-19 | Resolved: 2023-02-17

## 2023-03-10 ENCOUNTER — OFFICE VISIT (OUTPATIENT)
Dept: UROLOGY | Facility: CLINIC | Age: 53
End: 2023-03-10

## 2023-03-10 VITALS
BODY MASS INDEX: 27.86 KG/M2 | RESPIRATION RATE: 18 BRPM | DIASTOLIC BLOOD PRESSURE: 82 MMHG | OXYGEN SATURATION: 99 % | HEIGHT: 71 IN | WEIGHT: 199 LBS | HEART RATE: 62 BPM | SYSTOLIC BLOOD PRESSURE: 126 MMHG

## 2023-03-10 DIAGNOSIS — Z13.89 SCREENING FOR BLOOD OR PROTEIN IN URINE: Primary | ICD-10-CM

## 2023-03-10 LAB
SL AMB  POCT GLUCOSE, UA: NORMAL
SL AMB LEUKOCYTE ESTERASE,UA: NORMAL
SL AMB POCT BILIRUBIN,UA: NORMAL
SL AMB POCT BLOOD,UA: NORMAL
SL AMB POCT CLARITY,UA: CLEAR
SL AMB POCT COLOR,UA: YELLOW
SL AMB POCT KETONES,UA: NORMAL
SL AMB POCT NITRITE,UA: NORMAL
SL AMB POCT PH,UA: 5
SL AMB POCT SPECIFIC GRAVITY,UA: 1.01
SL AMB POCT URINE PROTEIN: NORMAL
SL AMB POCT UROBILINOGEN: 0.2

## 2023-03-10 NOTE — PROGRESS NOTES
1  Screening for blood or protein in urine  Ambulatory Referral to Urology    POCT urine dip            Assessment and plan:       1  Urine screening  - I reviewed with the patient that his urine is negative for microscopic hematuria  We reviewed diagnostic criteria with >3RBC/hpf on microscopy  Has never visualized gross hematuria  Comfortable with LUTS at this time off pharmacotherapy  Does not require hematuria workup currently  - recommend annual urine testing through PCP  If >3RBC/hpf, referral back to urology   - continue annual prostate cancer screening through PCP with PSA and FRANCINE  Currently up to date  Bharat Salguero PA-C      Chief Complaint     Chief Complaint   Patient presents with   • microscopic hematuria         History of Present Illness     Samm Andino is a 50 y o  male presenting today for consultation  Patient overall comfortable with urination  No dysuria, gross hematuria, infections, incontinence  No prior  surgical manipulation  PSA trend:   1 0 (12/16/21)  1 1 (12/20/22)    Urinalysis with microscopy with 1-2 RBC/hpf (12/20/22)  Abdominal US (12/2022) negative for an upper tract abnormality  Patient is a non-smoker (prior history of occasional cigar smoking  No family history of  malignancies       AUA SYMPTOM SCORE    Flowsheet Row Most Recent Value   AUA SYMPTOM SCORE    How often have you had a sensation of not emptying your bladder completely after you finished urinating? 0 (P)     How often have you had to urinate again less than two hours after you finished urinating? 1 (P)     How often have you found you stopped and started again several times when you urinate? 0 (P)     How often have you found it difficult to postpone urination? 0 (P)     How often have you had a weak urinary stream? 0 (P)     How often have you had to push or strain to begin urination? 0 (P)     How many times did you most typically get up to urinate from the time you went to bed at night until the time you got up in the morning? 1 (P)     Quality of Life: If you were to spend the rest of your life with your urinary condition just the way it is now, how would you feel about that? 2 (P)     AUA SYMPTOM SCORE 2 (P)             Laboratory     Lab Results   Component Value Date    CREATININE 1 17 12/20/2022       Lab Results   Component Value Date    PSA 1 1 12/20/2022    PSA 1 0 12/16/2021    PSA 1 1 09/24/2020       Review of Systems     Review of Systems   Constitutional: Negative for activity change, appetite change, chills, diaphoresis, fatigue, fever and unexpected weight change  Respiratory: Negative for chest tightness and shortness of breath  Cardiovascular: Negative for chest pain, palpitations and leg swelling  Gastrointestinal: Negative for abdominal distention, abdominal pain, constipation, diarrhea, nausea and vomiting  Genitourinary: Negative for decreased urine volume, difficulty urinating, dysuria, enuresis, flank pain, frequency, genital sores, hematuria and urgency  Musculoskeletal: Negative for back pain, gait problem and myalgias  Skin: Negative for color change, pallor, rash and wound  Psychiatric/Behavioral: Negative for behavioral problems  The patient is not nervous/anxious            AUA SYMPTOM SCORE    Flowsheet Row Most Recent Value   AUA SYMPTOM SCORE    How often have you had a sensation of not emptying your bladder completely after you finished urinating? 0 (P)     How often have you had to urinate again less than two hours after you finished urinating? 1 (P)     How often have you found you stopped and started again several times when you urinate? 0 (P)     How often have you found it difficult to postpone urination? 0 (P)     How often have you had a weak urinary stream? 0 (P)     How often have you had to push or strain to begin urination? 0 (P)     How many times did you most typically get up to urinate from the time you went to bed at night until the time you got up in the morning? 1 (P)     Quality of Life: If you were to spend the rest of your life with your urinary condition just the way it is now, how would you feel about that? 2 (P)     AUA SYMPTOM SCORE 2 (P)           Allergies     No Known Allergies    Physical Exam     Physical Exam  Constitutional:       General: He is not in acute distress  Appearance: Normal appearance  He is normal weight  He is not ill-appearing, toxic-appearing or diaphoretic  HENT:      Head: Normocephalic and atraumatic  Eyes:      General:         Right eye: No discharge  Left eye: No discharge  Conjunctiva/sclera: Conjunctivae normal    Pulmonary:      Effort: Pulmonary effort is normal  No respiratory distress  Musculoskeletal:         General: No swelling or tenderness  Normal range of motion  Skin:     General: Skin is warm and dry  Coloration: Skin is not jaundiced or pale  Neurological:      General: No focal deficit present  Mental Status: He is alert and oriented to person, place, and time  Psychiatric:         Mood and Affect: Mood normal          Behavior: Behavior normal          Vital Signs     Vitals:    03/10/23 0756   BP: 126/82   BP Location: Right arm   Patient Position: Sitting   Cuff Size: Standard   Pulse: 62   Resp: 18   SpO2: 99%   Weight: 90 3 kg (199 lb)   Height: 5' 11" (1 803 m)         Current Medications     No current outpatient medications on file  Active Problems     Patient Active Problem List   Diagnosis   • Vitamin D deficiency   • Mixed hyperlipidemia   • Microscopic hematuria   • Elevated glucose   • Primary osteoarthritis of both knees   • Plantar fasciitis   • Pain in left foot   • Ascending aorta dilation (HCC)   • Elevated blood pressure reading         Past Medical History     History reviewed  No pertinent past medical history        Surgical History     Past Surgical History:   Procedure Laterality Date   • WISDOM TOOTH EXTRACTION Family History     Family History   Problem Relation Age of Onset   • Breast cancer Mother    • Diabetes type II Father    • Hypertension Father        Social History     Social History       Radiology

## 2024-02-01 DIAGNOSIS — Z00.00 ENCOUNTER FOR PREVENTIVE HEALTH EXAMINATION: Primary | ICD-10-CM

## 2024-02-29 ENCOUNTER — APPOINTMENT (OUTPATIENT)
Dept: LAB | Facility: CLINIC | Age: 54
End: 2024-02-29

## 2024-02-29 ENCOUNTER — HOSPITAL ENCOUNTER (OUTPATIENT)
Dept: NON INVASIVE DIAGNOSTICS | Facility: CLINIC | Age: 54
Discharge: HOME | End: 2024-02-29

## 2024-02-29 ENCOUNTER — OFFICE VISIT (OUTPATIENT)
Dept: FAMILY MEDICINE CLINIC | Facility: CLINIC | Age: 54
End: 2024-02-29

## 2024-02-29 VITALS
HEIGHT: 71 IN | HEART RATE: 68 BPM | WEIGHT: 197 LBS | SYSTOLIC BLOOD PRESSURE: 128 MMHG | BODY MASS INDEX: 27.58 KG/M2 | DIASTOLIC BLOOD PRESSURE: 74 MMHG

## 2024-02-29 VITALS
HEART RATE: 56 BPM | DIASTOLIC BLOOD PRESSURE: 74 MMHG | BODY MASS INDEX: 27.69 KG/M2 | OXYGEN SATURATION: 100 % | RESPIRATION RATE: 14 BRPM | HEIGHT: 71 IN | TEMPERATURE: 98.1 F | SYSTOLIC BLOOD PRESSURE: 128 MMHG | WEIGHT: 197.8 LBS

## 2024-02-29 DIAGNOSIS — Z00.00 ENCOUNTER FOR PREVENTIVE HEALTH EXAMINATION: ICD-10-CM

## 2024-02-29 DIAGNOSIS — M17.0 PRIMARY OSTEOARTHRITIS OF BOTH KNEES: ICD-10-CM

## 2024-02-29 DIAGNOSIS — E78.2 MIXED HYPERLIPIDEMIA: ICD-10-CM

## 2024-02-29 DIAGNOSIS — R31.29 MICROSCOPIC HEMATURIA: ICD-10-CM

## 2024-02-29 DIAGNOSIS — E55.9 VITAMIN D DEFICIENCY: ICD-10-CM

## 2024-02-29 DIAGNOSIS — R73.03 PREDIABETES: ICD-10-CM

## 2024-02-29 DIAGNOSIS — R79.82 ELEVATED C-REACTIVE PROTEIN (CRP): ICD-10-CM

## 2024-02-29 DIAGNOSIS — Z00.00 WELL ADULT EXAM: Primary | ICD-10-CM

## 2024-02-29 PROBLEM — I77.810 ASCENDING AORTA DILATION (HCC): Status: RESOLVED | Noted: 2022-12-20 | Resolved: 2024-02-29

## 2024-02-29 PROBLEM — R03.0 ELEVATED BLOOD PRESSURE READING: Status: RESOLVED | Noted: 2022-12-20 | Resolved: 2024-02-29

## 2024-02-29 PROBLEM — M79.672 PAIN IN LEFT FOOT: Status: RESOLVED | Noted: 2022-04-09 | Resolved: 2024-02-29

## 2024-02-29 PROBLEM — R73.09 ELEVATED GLUCOSE: Status: RESOLVED | Noted: 2018-03-29 | Resolved: 2024-02-29

## 2024-02-29 PROBLEM — M72.2 PLANTAR FASCIITIS: Status: RESOLVED | Noted: 2022-04-09 | Resolved: 2024-02-29

## 2024-02-29 LAB
25(OH)D3 SERPL-MCNC: 44.4 NG/ML (ref 30–100)
ALBUMIN SERPL BCP-MCNC: 4.5 G/DL (ref 3.5–5)
ALP SERPL-CCNC: 49 U/L (ref 34–104)
ALT SERPL W P-5'-P-CCNC: 13 U/L (ref 7–52)
ANION GAP SERPL CALCULATED.3IONS-SCNC: 4 MMOL/L
AORTIC ROOT: 3.3 CM
APICAL FOUR CHAMBER EJECTION FRACTION: 68 %
ASCENDING AORTA: 3.6 CM
AST SERPL W P-5'-P-CCNC: 13 U/L (ref 13–39)
ATRIAL RATE: 63 BPM
BACTERIA UR QL AUTO: ABNORMAL /HPF
BASOPHILS # BLD AUTO: 0.05 THOUSANDS/ÂΜL (ref 0–0.1)
BASOPHILS NFR BLD AUTO: 1 % (ref 0–1)
BILIRUB SERPL-MCNC: 0.91 MG/DL (ref 0.2–1)
BILIRUB UR QL STRIP: NEGATIVE
BSA FOR ECHO PROCEDURE: 2.1 M2
BUN SERPL-MCNC: 16 MG/DL (ref 5–25)
CALCIUM SERPL-MCNC: 9 MG/DL (ref 8.4–10.2)
CHEST PAIN STATEMENT: NORMAL
CHLORIDE SERPL-SCNC: 104 MMOL/L (ref 96–108)
CHOLEST SERPL-MCNC: 192 MG/DL
CLARITY UR: CLEAR
CO2 SERPL-SCNC: 31 MMOL/L (ref 21–32)
COLOR UR: ABNORMAL
CREAT SERPL-MCNC: 1.21 MG/DL (ref 0.6–1.3)
CRP SERPL HS-MCNC: 19.77 MG/L
E WAVE DECELERATION TIME: 133 MS
E/A RATIO: 0.89
EOSINOPHIL # BLD AUTO: 0.14 THOUSAND/ÂΜL (ref 0–0.61)
EOSINOPHIL NFR BLD AUTO: 3 % (ref 0–6)
ERYTHROCYTE [DISTWIDTH] IN BLOOD BY AUTOMATED COUNT: 13 % (ref 11.6–15.1)
EST. AVERAGE GLUCOSE BLD GHB EST-MCNC: 131 MG/DL
FRACTIONAL SHORTENING: 37 (ref 28–44)
GFR SERPL CREATININE-BSD FRML MDRD: 68 ML/MIN/1.73SQ M
GLUCOSE P FAST SERPL-MCNC: 96 MG/DL (ref 65–99)
GLUCOSE UR STRIP-MCNC: NEGATIVE MG/DL
HBA1C MFR BLD: 6.2 %
HCT VFR BLD AUTO: 43.9 % (ref 36.5–49.3)
HDLC SERPL-MCNC: 42 MG/DL
HGB BLD-MCNC: 14.4 G/DL (ref 12–17)
HGB UR QL STRIP.AUTO: ABNORMAL
IMM GRANULOCYTES # BLD AUTO: 0.01 THOUSAND/UL (ref 0–0.2)
IMM GRANULOCYTES NFR BLD AUTO: 0 % (ref 0–2)
INTERVENTRICULAR SEPTUM IN DIASTOLE (PARASTERNAL SHORT AXIS VIEW): 0.6 CM
INTERVENTRICULAR SEPTUM: 0.6 CM (ref 0.6–1.1)
KETONES UR STRIP-MCNC: NEGATIVE MG/DL
LAAS-AP2: 26.5 CM2
LAAS-AP4: 26.7 CM2
LDLC SERPL CALC-MCNC: 128 MG/DL (ref 0–100)
LEFT ATRIUM SIZE: 4.7 CM
LEFT ATRIUM VOLUME (MOD BIPLANE): 89 ML
LEFT ATRIUM VOLUME INDEX (MOD BIPLANE): 42.4 ML/M2
LEFT INTERNAL DIMENSION IN SYSTOLE: 3.1 CM (ref 2.1–4)
LEFT VENTRICULAR INTERNAL DIMENSION IN DIASTOLE: 4.9 CM (ref 3.5–6)
LEFT VENTRICULAR POSTERIOR WALL IN END DIASTOLE: 0.9 CM
LEFT VENTRICULAR STROKE VOLUME: 76 ML
LEUKOCYTE ESTERASE UR QL STRIP: NEGATIVE
LVSV (TEICH): 76 ML
LYMPHOCYTES # BLD AUTO: 1.56 THOUSANDS/ÂΜL (ref 0.6–4.47)
LYMPHOCYTES NFR BLD AUTO: 37 % (ref 14–44)
MAX DIASTOLIC BP: 94 MMHG
MAX HR PERCENT: 95 %
MAX HR: 162 BPM
MAX PREDICTED HEART RATE: 169 BPM
MCH RBC QN AUTO: 30.4 PG (ref 26.8–34.3)
MCHC RBC AUTO-ENTMCNC: 32.8 G/DL (ref 31.4–37.4)
MCV RBC AUTO: 93 FL (ref 82–98)
MONOCYTES # BLD AUTO: 0.41 THOUSAND/ÂΜL (ref 0.17–1.22)
MONOCYTES NFR BLD AUTO: 10 % (ref 4–12)
MV E'TISSUE VEL-SEP: 13 CM/S
MV PEAK A VEL: 0.85 M/S
MV PEAK E VEL: 76 CM/S
MV STENOSIS PRESSURE HALF TIME: 39 MS
MV VALVE AREA P 1/2 METHOD: 5.64
NEUTROPHILS # BLD AUTO: 2.07 THOUSANDS/ÂΜL (ref 1.85–7.62)
NEUTS SEG NFR BLD AUTO: 49 % (ref 43–75)
NITRITE UR QL STRIP: NEGATIVE
NON-SQ EPI CELLS URNS QL MICRO: ABNORMAL /HPF
NRBC BLD AUTO-RTO: 0 /100 WBCS
P AXIS: 26 DEGREES
PH UR STRIP.AUTO: 6 [PH]
PLATELET # BLD AUTO: 237 THOUSANDS/UL (ref 149–390)
PMV BLD AUTO: 10.3 FL (ref 8.9–12.7)
POTASSIUM SERPL-SCNC: 4.3 MMOL/L (ref 3.5–5.3)
PR INTERVAL: 160 MS
PROT SERPL-MCNC: 7.1 G/DL (ref 6.4–8.4)
PROT UR STRIP-MCNC: NEGATIVE MG/DL
PROTOCOL NAME: NORMAL
PSA SERPL-MCNC: 1.52 NG/ML (ref 0–4)
QRS AXIS: 24 DEGREES
QRSD INTERVAL: 88 MS
QT INTERVAL: 440 MS
QTC INTERVAL: 450 MS
RATE PRESSURE PRODUCT: NORMAL
RBC # BLD AUTO: 4.74 MILLION/UL (ref 3.88–5.62)
RBC #/AREA URNS AUTO: ABNORMAL /HPF
REASON FOR TERMINATION: NORMAL
RIGHT ATRIUM AREA SYSTOLE A4C: 14.4 CM2
RIGHT VENTRICLE ID DIMENSION: 3.5 CM
SINOTUBULAR JUNCTION: 3.2 CM
SL CV LEFT ATRIUM LENGTH A2C: 6.5 CM
SL CV LV EF: 65
SL CV LV EF: 65
SL CV PED ECHO LEFT VENTRICLE DIASTOLIC VOLUME (MOD BIPLANE) 2D: 115 ML
SL CV PED ECHO LEFT VENTRICLE SYSTOLIC VOLUME (MOD BIPLANE) 2D: 39 ML
SL CV SINUS OF VALSALVA 2D: 3.4 CM
SL CV STRESS RECOVERY BP: NORMAL MMHG
SL CV STRESS RECOVERY HR: 100 BPM
SL CV STRESS RECOVERY O2 SAT: 98 %
SL CV STRESS STAGE REACHED: 5
SODIUM SERPL-SCNC: 139 MMOL/L (ref 135–147)
SP GR UR STRIP.AUTO: 1.02 (ref 1–1.03)
STJ: 3.2 CM
STRESS ANGINA INDEX: 0
STRESS BASELINE BP: NORMAL MMHG
STRESS BASELINE HR: 72 BPM
STRESS O2 SAT REST: 98 %
STRESS PEAK HR: 162 BPM
STRESS POST ESTIMATED WORKLOAD: 15.3 METS
STRESS POST EXERCISE DUR MIN: 13 MIN
STRESS POST EXERCISE DUR MIN: 13 MIN
STRESS POST EXERCISE DUR SEC: 2 SEC
STRESS POST EXERCISE DUR SEC: 2 SEC
STRESS POST O2 SAT PEAK: 98 %
STRESS POST PEAK BP: 168 MMHG
STRESS POST PEAK HR: 162 BPM
STRESS POST PEAK SYSTOLIC BP: 194 MMHG
T WAVE AXIS: 9 DEGREES
TARGET HR FORMULA: NORMAL
TR MAX PG: 30 MMHG
TR PEAK VELOCITY: 2.7 M/S
TRICUSPID ANNULAR PLANE SYSTOLIC EXCURSION: 1.9 CM
TRICUSPID VALVE PEAK REGURGITATION VELOCITY: 2.73 M/S
TRIGL SERPL-MCNC: 110 MG/DL
TSH SERPL DL<=0.05 MIU/L-ACNC: 2.01 UIU/ML (ref 0.45–4.5)
UROBILINOGEN UR STRIP-ACNC: <2 MG/DL
VENTRICULAR RATE: 63 BPM
WBC # BLD AUTO: 4.24 THOUSAND/UL (ref 4.31–10.16)
WBC #/AREA URNS AUTO: ABNORMAL /HPF

## 2024-02-29 PROCEDURE — 93350 STRESS TTE ONLY: CPT | Performed by: INTERNAL MEDICINE

## 2024-02-29 PROCEDURE — 93350 STRESS TTE ONLY: CPT

## 2024-02-29 PROCEDURE — 93010 ELECTROCARDIOGRAM REPORT: CPT | Performed by: INTERNAL MEDICINE

## 2024-02-29 PROCEDURE — 36415 COLL VENOUS BLD VENIPUNCTURE: CPT

## 2024-02-29 PROCEDURE — 80061 LIPID PANEL: CPT

## 2024-02-29 PROCEDURE — 80053 COMPREHEN METABOLIC PANEL: CPT

## 2024-02-29 PROCEDURE — 99499EX: Performed by: INTERNAL MEDICINE

## 2024-02-29 PROCEDURE — 83036 HEMOGLOBIN GLYCOSYLATED A1C: CPT

## 2024-02-29 PROCEDURE — 85025 COMPLETE CBC W/AUTO DIFF WBC: CPT

## 2024-02-29 PROCEDURE — 82306 VITAMIN D 25 HYDROXY: CPT

## 2024-02-29 PROCEDURE — 93005 ELECTROCARDIOGRAM TRACING: CPT

## 2024-02-29 PROCEDURE — 84153 ASSAY OF PSA TOTAL: CPT

## 2024-02-29 PROCEDURE — 93306 TTE W/DOPPLER COMPLETE: CPT

## 2024-02-29 PROCEDURE — 93306 TTE W/DOPPLER COMPLETE: CPT | Performed by: INTERNAL MEDICINE

## 2024-02-29 PROCEDURE — 86141 C-REACTIVE PROTEIN HS: CPT

## 2024-02-29 PROCEDURE — 84443 ASSAY THYROID STIM HORMONE: CPT

## 2024-02-29 PROCEDURE — 81001 URINALYSIS AUTO W/SCOPE: CPT

## 2024-02-29 RX ORDER — MELATONIN 10 MG
1 TABLET, SUBLINGUAL SUBLINGUAL DAILY
Start: 2024-02-29

## 2024-02-29 NOTE — PROGRESS NOTES
"  Your [facility] Board-Certified Dermatologist's Name: Mariia Suarez MD    Skin Screening Exam:    A chaperone was present throughout the entire encounter.      SKIN:  FULL ORGAN SYSTEM EXAM   Hair, Scalp, Ears, Face Normal except as noted below in Assessment   Neck, Cervical Chain Nodes Normal except as noted below in Assessment   Right Arm/Hand/Fingers Normal except as noted below in Assessment   Left Arm/Hand/Fingers Normal except as noted below in Assessment   Chest/Breasts/Axillae Did the patient specifically refuse to have the areas \"under-the-bra\" examined by the Dermatologist? No  Examined areas normal except as noted below in Assessment   Abdomen, Umbilicus Normal except as noted below in Assessment   Back/Spine Normal except as noted below in Assessment       Right Leg, Foot, Toes Normal except as noted below in Assessment   Left Leg, Foot, Toes Normal except as noted below in Assessment        Assessment and Plan by Diagnosis:    Use a moisturizer + sunscreen \"combo\" product such as Neutrogena Daily Defense SPF 50+ or CeraVe AM at least three times a day.  Follow-up with your private dermatologist or one of our board-certified [facility] Dermatologists as discussed.  [facility] Dermatology's own Dr. Beverly Rubio is available for consultation for skin enhancement and revitalization services; please mention \"[facility]\" for expedited scheduling      MELANOCYTIC NEVI (\"Moles\")    Physical Exam:  Anatomic Location Affected:   Scattered across body  Morphological Description:  Scattered, 1-4mm round to ovoid, symmetrical-appearing, even bordered, skin colored to dark brown macules/papules, mostly in sun-exposed areas  Pertinent Positives:  Pertinent Negatives:    Additional History of Present Condition:  NA    Assessment and Plan:  Based on a thorough discussion of this condition and the management approach to it (including a comprehensive discussion of the known risks, side effects and potential benefits " "of treatment), the patient (family) agrees to implement the following specific plan:  Monitor for changes     Melanocytic Nevi  Melanocytic nevi (\"moles\") are tan or brown, raised or flat areas of the skin which have an increased number of melanocytes. Melanocytes are the cells in our body which make pigment and account for skin color.    Some moles are present at birth (I.e., \"congenital nevi\"), while others come up later in life (i.e., \"acquired nevi\").  The sun can stimulate the body to make more moles.  Sunburns are not the only thing that triggers more moles.  Chronic sun exposure can do it too.     Clinically distinguishing a healthy mole from melanoma may be difficult, even for experienced dermatologists. The \"ABCDE's\" of moles have been suggested as a means of helping to alert a person to a suspicious mole and the possible increased risk of melanoma.  The suggestions for raising alert are as follows:    Asymmetry: Healthy moles tend to be symmetric, while melanomas are often asymmetric.  Asymmetry means if you draw a line through the mole, the two halves do not match in color, size, shape, or surface texture. Asymmetry can be a result of rapid enlargement of a mole, the development of a raised area on a previously flat lesion, scaling, ulceration, bleeding or scabbing within the mole.  Any mole that starts to demonstrate \"asymmetry\" should be examined promptly by a board certified dermatologist.     Border: Healthy moles tend to have discrete, even borders.  The border of a melanoma often blends into the normal skin and does not sharply delineate the mole from normal skin.  Any mole that starts to demonstrate \"uneven borders\" should be examined promptly by a board certified dermatologist.     Color: Healthy moles tend to be one color throughout.  Melanomas tend to be made up of different colors ranging from dark black, blue, white, or red.  Any mole that demonstrates a color change should be examined " "promptly by a board certified dermatologist.     Diameter: Healthy moles tend to be smaller than 0.6 cm in size; an exception are \"congenital nevi\" that can be larger.  Melanomas tend to grow and can often be greater than 0.6 cm (1/4 of an inch, or the size of a pencil eraser). This is only a guideline, and many normal moles may be larger than 0.6 cm without being unhealthy.  Any mole that starts to change in size (small to bigger or bigger to smaller) should be examined promptly by a board certified dermatologist.     Evolving: Healthy moles tend to \"stay the same.\"  Melanomas may often show signs of change or evolution such as a change in size, shape, color, or elevation.  Any mole that starts to itch, bleed, crust, burn, hurt, or ulcerate or demonstrate a change or evolution should be examined promptly by a board certified dermatologist.      Dysplastic Nevi  Dysplastic moles are moles that fit the ABCDE rules of melanoma but are not identified as melanomas when examined under the microscope.  They may indicate an increased risk of melanoma in that person. If there is a family history of melanoma, most experts agree that the person may be at an increased risk for developing a melanoma.  Experts still do not agree on what dysplastic moles mean in patients without a personal or family history of melanoma.  Dysplastic moles are usually larger than common moles and have different colors within it with irregular borders. The appearance can be very similar to a melanoma. Biopsies of dysplastic moles may show abnormalities which are different from a regular mole.      Melanoma  Malignant melanoma is a type of skin cancer that can be deadly if it spreads throughout the body. The incidence of melanoma in the [place] is growing faster than any other cancer. Melanoma usually grows near the surface of the skin for a period of time, and then begins to grow deeper into the skin. Once it grows deeper into the skin, the " "risk of spread to other organs greatly increases. Therefore, early detection and removal of a malignant melanoma may result in a better chance at a complete cure; removal after the tumor has spread may not be as effective, leading to worse clinical outcomes such as death.    The true rate of nevus transformation into a melanoma is unknown. It has been estimated that the lifetime risk for any acquired melanocytic nevus on any 20-year-old individual transforming into melanoma by age 80 is 0.03% (1 in 3,164) for men and 0.009% (1 in 10,800) for women.     The appearance of a \"new mole\" remains one of the most reliable methods for identifying a malignant melanoma.  Occasionally, melanomas appear as rapidly growing, blue-black, dome-shaped bumps within a previous mole or previous area of normal skin.  Other times, melanomas are suspected when a mole suddenly appears or changes. Itching, burning, or pain in a pigmented lesion should increase suspicion, but most patients with early melanoma have no skin discomfort whatsoever.  Melanoma can occur anywhere on the skin, including areas that are difficult for self-examination. Many melanomas are first noticed by other family members.  Suspicious-looking moles may be removed for microscopic examination.       You may be able to prevent death from melanoma by doing two simple things:    Try to avoid unnecessary sun exposure and protect your skin when it is exposed to the sun.  People who live near the equator, people who have intermittent exposures to large amounts of sun, and people who have had sunburns in childhood or adolescence have an increased risk for melanoma. Sun sense and vigilant sun protection may be keys to helping to prevent melanoma.  We recommend wearing UPF-rated sun protective clothing and sunglasses whenever possible and applying a moisturizer-sunscreen combination product (SPF 50+) such as Neutrogena Daily Defense to sun exposed areas of skin at least three " "times a day.    Have your moles regularly examined by a board certified dermatologist AND by yourself or a family member/friend at home.  We recommend that you have your moles examined at least once a year by a board certified dermatologist.  Use your birthday as an annual reminder to have your \"Birthday Suit\" (I.e., your skin) examined; it is a nice birthday gift to yourself to know that your skin is healthy appearing!  Additionally, at-home self examinations may be helpful for detecting a possible melanoma.  Use the ABCDEs we discussed and check your moles once a month at home.        "

## 2024-02-29 NOTE — PROGRESS NOTES
HEART AND VASCULAR SUMMARY    1. Lipids: Total 192, , HDL 42,     2. ECG: Normal    3. Echocardiogram: Normal except for very mild mitral regurgitation and mild left atrial enlargement    4. Stress ECHO: Normal    5. Coronary Calcium CT: 0 in 2018    6. The 10-year ASCVD risk score (Stewart ENG, et al., 2019) is: 4.3%    Values used to calculate the score:      Age: 53 years      Sex: Male      Is Non- : No      Diabetic: No      Tobacco smoker: No      Systolic Blood Pressure: 128 mmHg      Is BP treated: No      HDL Cholesterol: 42 mg/dL      Total Cholesterol: 192 mg/dL     7. HSCRP:   Lab Results   Component Value Date    HSCRP 19.77 (H) 02/29/2024       8. Vitals:   Vitals:    02/29/24 0930   BP: 128/74   Pulse: 56   Resp: 14   Temp: 98.1 °F (36.7 °C)   SpO2: 100%       Impression and Recommendations:    This is the best cholesterol he has had, total cholesterol down to 192, previously as high as 235, and , previously as high as 167.  Cardiac risk calculates low  Continue aggressive lifestyle interventions, frequent exercise, and low-fat diet  Blood pressure is normal     We discussed her family history and how it could impact her own future risks.  We discussed family vs. genetic history and the importance and implications of each.  All questions answered to her satisfaction.  She knows that as additional family members are diagnosed - she will need to let us know as this may change follow up and imaging recommendations.    We had a discussion concerning Breast Cancer Risk Reduction and current NCCN Guidelines. She knows that her risk can be lowered slightly with a healthy lifestyle and minimal ETOH use. Being physically active will also help. She should reduce or stay away from OCPs and HRT as possible.

## 2024-02-29 NOTE — PROGRESS NOTES
Fitness Summary and Recommendations: Donaldo scored at the 60% on his body composition assessment with a bodyfat % of 21.1%. Donaldo scored in the average range for flexibility with a sit & reach score of 21 cm. His Muscle Strength/Endurance scores placed him at the 50% (lower body) and 95% (upper body) with a chair stand score of 22 and arm curl score of 30 respectively. Donaldo Cardiovascular Score of 60.3 placed him at the 95%. Overall, Donaldo would be considered to have an above average fitness level with a 69% score. His overall fitness score has increased by 0% from his previous test on 12/20/22. Continued emphasis on regular exercise and sound nutrition practices will enable Donaldo to reach his optimal level of fitness.

## 2025-02-25 DIAGNOSIS — Z00.00 ENCOUNTER FOR PREVENTIVE HEALTH EXAMINATION: Primary | ICD-10-CM

## 2025-03-11 ENCOUNTER — APPOINTMENT (OUTPATIENT)
Dept: LAB | Facility: CLINIC | Age: 55
End: 2025-03-11

## 2025-03-11 ENCOUNTER — OFFICE VISIT (OUTPATIENT)
Dept: FAMILY MEDICINE CLINIC | Facility: CLINIC | Age: 55
End: 2025-03-11

## 2025-03-11 ENCOUNTER — HOSPITAL ENCOUNTER (OUTPATIENT)
Dept: NON INVASIVE DIAGNOSTICS | Facility: CLINIC | Age: 55
Discharge: HOME | End: 2025-03-11

## 2025-03-11 ENCOUNTER — HOSPITAL ENCOUNTER (OUTPATIENT)
Dept: ULTRASOUND IMAGING | Facility: HOSPITAL | Age: 55
Discharge: HOME | End: 2025-03-11
Attending: FAMILY MEDICINE

## 2025-03-11 ENCOUNTER — HOSPITAL ENCOUNTER (OUTPATIENT)
Dept: CT IMAGING | Facility: HOSPITAL | Age: 55
Discharge: HOME | End: 2025-03-11
Attending: FAMILY MEDICINE

## 2025-03-11 ENCOUNTER — HOSPITAL ENCOUNTER (OUTPATIENT)
Dept: VASCULAR ULTRASOUND | Facility: HOSPITAL | Age: 55
Discharge: HOME | End: 2025-03-11
Attending: FAMILY MEDICINE

## 2025-03-11 VITALS — BODY MASS INDEX: 28.42 KG/M2 | HEIGHT: 71 IN | WEIGHT: 203 LBS

## 2025-03-11 VITALS
DIASTOLIC BLOOD PRESSURE: 84 MMHG | WEIGHT: 203 LBS | BODY MASS INDEX: 28.42 KG/M2 | HEIGHT: 71 IN | HEART RATE: 77 BPM | SYSTOLIC BLOOD PRESSURE: 126 MMHG

## 2025-03-11 VITALS
HEART RATE: 77 BPM | DIASTOLIC BLOOD PRESSURE: 84 MMHG | HEIGHT: 71 IN | WEIGHT: 203 LBS | SYSTOLIC BLOOD PRESSURE: 126 MMHG | OXYGEN SATURATION: 100 % | BODY MASS INDEX: 28.42 KG/M2 | RESPIRATION RATE: 14 BRPM

## 2025-03-11 DIAGNOSIS — Z00.00 ENCOUNTER FOR PREVENTIVE HEALTH EXAMINATION: ICD-10-CM

## 2025-03-11 DIAGNOSIS — Z86.0100 HISTORY OF COLON POLYPS: ICD-10-CM

## 2025-03-11 DIAGNOSIS — R31.29 MICROSCOPIC HEMATURIA: ICD-10-CM

## 2025-03-11 DIAGNOSIS — I65.22 STENOSIS OF LEFT CAROTID ARTERY: ICD-10-CM

## 2025-03-11 DIAGNOSIS — E78.2 MIXED HYPERLIPIDEMIA: ICD-10-CM

## 2025-03-11 DIAGNOSIS — R73.03 PREDIABETES: Primary | ICD-10-CM

## 2025-03-11 DIAGNOSIS — E66.3 OVERWEIGHT (BMI 25.0-29.9): ICD-10-CM

## 2025-03-11 LAB
25(OH)D3 SERPL-MCNC: 32.8 NG/ML (ref 30–100)
ALBUMIN SERPL BCG-MCNC: 4.7 G/DL (ref 3.5–5)
ALP SERPL-CCNC: 39 U/L (ref 34–104)
ALT SERPL W P-5'-P-CCNC: 19 U/L (ref 7–52)
ANION GAP SERPL CALCULATED.3IONS-SCNC: 4 MMOL/L (ref 4–13)
AORTIC ROOT: 3.3 CM
ASCENDING AORTA: 3.5 CM
AST SERPL W P-5'-P-CCNC: 19 U/L (ref 13–39)
ATRIAL RATE: 51 BPM
BACTERIA UR QL AUTO: ABNORMAL /HPF
BASOPHILS # BLD AUTO: 0.04 THOUSANDS/ÂΜL (ref 0–0.1)
BASOPHILS NFR BLD AUTO: 1 % (ref 0–1)
BILIRUB SERPL-MCNC: 0.93 MG/DL (ref 0.2–1)
BILIRUB UR QL STRIP: NEGATIVE
BSA FOR ECHO PROCEDURE: 2.12 M2
BUN SERPL-MCNC: 17 MG/DL (ref 5–25)
CALCIUM SERPL-MCNC: 9.3 MG/DL (ref 8.4–10.2)
CHEST PAIN STATEMENT: NORMAL
CHLORIDE SERPL-SCNC: 104 MMOL/L (ref 96–108)
CHOLEST SERPL-MCNC: 222 MG/DL (ref ?–200)
CLARITY UR: CLEAR
CO2 SERPL-SCNC: 32 MMOL/L (ref 21–32)
COLOR UR: ABNORMAL
CREAT SERPL-MCNC: 1.04 MG/DL (ref 0.6–1.3)
CRP SERPL HS-MCNC: 1.26 MG/L
E WAVE DECELERATION TIME: 147 MS
E/A RATIO: 0.86
EOSINOPHIL # BLD AUTO: 0.12 THOUSAND/ÂΜL (ref 0–0.61)
EOSINOPHIL NFR BLD AUTO: 3 % (ref 0–6)
ERYTHROCYTE [DISTWIDTH] IN BLOOD BY AUTOMATED COUNT: 13.2 % (ref 11.6–15.1)
EST. AVERAGE GLUCOSE BLD GHB EST-MCNC: 131 MG/DL
FRACTIONAL SHORTENING: 23 (ref 28–44)
GFR SERPL CREATININE-BSD FRML MDRD: 82 ML/MIN/1.73SQ M
GLUCOSE P FAST SERPL-MCNC: 99 MG/DL (ref 65–99)
GLUCOSE UR STRIP-MCNC: NEGATIVE MG/DL
HBA1C MFR BLD: 6.2 %
HCT VFR BLD AUTO: 46.1 % (ref 36.5–49.3)
HCV AB SER QL: NORMAL
HDLC SERPL-MCNC: 42 MG/DL
HGB BLD-MCNC: 15.2 G/DL (ref 12–17)
HGB UR QL STRIP.AUTO: ABNORMAL
IMM GRANULOCYTES # BLD AUTO: 0.01 THOUSAND/UL (ref 0–0.2)
IMM GRANULOCYTES NFR BLD AUTO: 0 % (ref 0–2)
INTERVENTRICULAR SEPTUM IN DIASTOLE (PARASTERNAL SHORT AXIS VIEW): 1 CM
INTERVENTRICULAR SEPTUM: 1 CM (ref 0.6–1.1)
KETONES UR STRIP-MCNC: NEGATIVE MG/DL
LAAS-AP2: 14.1 CM2
LAAS-AP4: 21.1 CM2
LDLC SERPL CALC-MCNC: 146 MG/DL (ref 0–100)
LEFT ATRIUM SIZE: 4.6 CM
LEFT ATRIUM VOLUME (MOD BIPLANE): 53 ML
LEFT ATRIUM VOLUME INDEX (MOD BIPLANE): 25 ML/M2
LEFT INTERNAL DIMENSION IN SYSTOLE: 3.3 CM (ref 2.1–4)
LEFT VENTRICULAR INTERNAL DIMENSION IN DIASTOLE: 4.3 CM (ref 3.5–6)
LEFT VENTRICULAR POSTERIOR WALL IN END DIASTOLE: 1 CM
LEFT VENTRICULAR STROKE VOLUME: 39 ML
LEUKOCYTE ESTERASE UR QL STRIP: NEGATIVE
LV EF US.2D.A4C+ESTIMATED: 55 %
LVSV (TEICH): 39 ML
LYMPHOCYTES # BLD AUTO: 1.83 THOUSANDS/ÂΜL (ref 0.6–4.47)
LYMPHOCYTES NFR BLD AUTO: 42 % (ref 14–44)
MAX DIASTOLIC BP: 86 MMHG
MAX HR PERCENT: 97 %
MAX HR: 164 BPM
MAX PREDICTED HEART RATE: 168 BPM
MCH RBC QN AUTO: 30.5 PG (ref 26.8–34.3)
MCHC RBC AUTO-ENTMCNC: 33 G/DL (ref 31.4–37.4)
MCV RBC AUTO: 92 FL (ref 82–98)
MONOCYTES # BLD AUTO: 0.44 THOUSAND/ÂΜL (ref 0.17–1.22)
MONOCYTES NFR BLD AUTO: 10 % (ref 4–12)
MV E'TISSUE VEL-LAT: 11 CM/S
MV E'TISSUE VEL-SEP: 9 CM/S
MV PEAK A VEL: 0.7 M/S
MV PEAK E VEL: 60 CM/S
MV STENOSIS PRESSURE HALF TIME: 43 MS
MV VALVE AREA P 1/2 METHOD: 5.12
NEUTROPHILS # BLD AUTO: 1.93 THOUSANDS/ÂΜL (ref 1.85–7.62)
NEUTS SEG NFR BLD AUTO: 44 % (ref 43–75)
NITRITE UR QL STRIP: NEGATIVE
NON-SQ EPI CELLS URNS QL MICRO: ABNORMAL /HPF
NRBC BLD AUTO-RTO: 0 /100 WBCS
P AXIS: 19 DEGREES
PH UR STRIP.AUTO: 6.5 [PH]
PLATELET # BLD AUTO: 193 THOUSANDS/UL (ref 149–390)
PMV BLD AUTO: 10.6 FL (ref 8.9–12.7)
POTASSIUM SERPL-SCNC: 4.4 MMOL/L (ref 3.5–5.3)
PR INTERVAL: 170 MS
PROT SERPL-MCNC: 6.9 G/DL (ref 6.4–8.4)
PROT UR STRIP-MCNC: NEGATIVE MG/DL
PROTOCOL NAME: NORMAL
PSA SERPL-MCNC: 1.66 NG/ML (ref 0–4)
QRS AXIS: 9 DEGREES
QRSD INTERVAL: 86 MS
QT INTERVAL: 452 MS
QTC INTERVAL: 417 MS
RA PRESSURE ESTIMATED: 10 MMHG
RATE PRESSURE PRODUCT: NORMAL
RBC # BLD AUTO: 4.99 MILLION/UL (ref 3.88–5.62)
RBC #/AREA URNS AUTO: ABNORMAL /HPF
REASON FOR TERMINATION: NORMAL
RIGHT ATRIUM AREA SYSTOLE A4C: 17.4 CM2
RIGHT VENTRICLE ID DIMENSION: 4.8 CM
RV PSP: 39 MMHG
SL CV LEFT ATRIUM LENGTH A2C: 4.4 CM
SL CV LV EF: 55
SL CV LV EF: 55
SL CV PED ECHO LEFT VENTRICLE DIASTOLIC VOLUME (MOD BIPLANE) 2D: 83 ML
SL CV PED ECHO LEFT VENTRICLE SYSTOLIC VOLUME (MOD BIPLANE) 2D: 44 ML
SL CV STRESS RECOVERY BP: NORMAL MMHG
SL CV STRESS RECOVERY HR: 100 BPM
SL CV STRESS STAGE REACHED: 5
SODIUM SERPL-SCNC: 140 MMOL/L (ref 135–147)
SP GR UR STRIP.AUTO: 1.02 (ref 1–1.03)
STRESS ANGINA INDEX: 0
STRESS BASELINE BP: NORMAL MMHG
STRESS BASELINE HR: 52 BPM
STRESS O2 SAT REST: 99 %
STRESS PEAK HR: 164 BPM
STRESS POST ESTIMATED WORKLOAD: 17.2 METS
STRESS POST EXERCISE DUR MIN: 14 MIN
STRESS POST EXERCISE DUR MIN: 14 MIN
STRESS POST EXERCISE DUR SEC: 0 SEC
STRESS POST EXERCISE DUR SEC: 0 SEC
STRESS POST PEAK BP: 208 MMHG
STRESS POST PEAK HR: 164 BPM
STRESS POST PEAK SYSTOLIC BP: 214 MMHG
T WAVE AXIS: 5 DEGREES
TARGET HR FORMULA: NORMAL
TEST INDICATION: NORMAL
TR MAX PG: 29 MMHG
TR PEAK VELOCITY: 2.7 M/S
TRICUSPID ANNULAR PLANE SYSTOLIC EXCURSION: 2.3 CM
TRICUSPID VALVE PEAK REGURGITATION VELOCITY: 2.67 M/S
TRIGL SERPL-MCNC: 171 MG/DL (ref ?–150)
TSH SERPL DL<=0.05 MIU/L-ACNC: 1.09 UIU/ML (ref 0.45–4.5)
UROBILINOGEN UR STRIP-ACNC: <2 MG/DL
VENTRICULAR RATE: 51 BPM
WBC # BLD AUTO: 4.37 THOUSAND/UL (ref 4.31–10.16)
WBC #/AREA URNS AUTO: ABNORMAL /HPF

## 2025-03-11 PROCEDURE — 80061 LIPID PANEL: CPT

## 2025-03-11 PROCEDURE — 86141 C-REACTIVE PROTEIN HS: CPT

## 2025-03-11 PROCEDURE — 93306 TTE W/DOPPLER COMPLETE: CPT | Performed by: INTERNAL MEDICINE

## 2025-03-11 PROCEDURE — 93922 UPR/L XTREMITY ART 2 LEVELS: CPT

## 2025-03-11 PROCEDURE — 85025 COMPLETE CBC W/AUTO DIFF WBC: CPT

## 2025-03-11 PROCEDURE — 75571 CT HRT W/O DYE W/CA TEST: CPT

## 2025-03-11 PROCEDURE — 36415 COLL VENOUS BLD VENIPUNCTURE: CPT

## 2025-03-11 PROCEDURE — 99499EX: Performed by: FAMILY MEDICINE

## 2025-03-11 PROCEDURE — 86803 HEPATITIS C AB TEST: CPT

## 2025-03-11 PROCEDURE — VASC: Performed by: SURGERY

## 2025-03-11 PROCEDURE — 93350 STRESS TTE ONLY: CPT | Performed by: INTERNAL MEDICINE

## 2025-03-11 PROCEDURE — 83695 ASSAY OF LIPOPROTEIN(A): CPT

## 2025-03-11 PROCEDURE — 93005 ELECTROCARDIOGRAM TRACING: CPT

## 2025-03-11 PROCEDURE — 81001 URINALYSIS AUTO W/SCOPE: CPT

## 2025-03-11 PROCEDURE — 83036 HEMOGLOBIN GLYCOSYLATED A1C: CPT

## 2025-03-11 PROCEDURE — 93350 STRESS TTE ONLY: CPT

## 2025-03-11 PROCEDURE — 93010 ELECTROCARDIOGRAM REPORT: CPT | Performed by: INTERNAL MEDICINE

## 2025-03-11 PROCEDURE — 84443 ASSAY THYROID STIM HORMONE: CPT

## 2025-03-11 PROCEDURE — 80053 COMPREHEN METABOLIC PANEL: CPT

## 2025-03-11 PROCEDURE — 76700 US EXAM ABDOM COMPLETE: CPT

## 2025-03-11 PROCEDURE — 93306 TTE W/DOPPLER COMPLETE: CPT

## 2025-03-11 PROCEDURE — 82306 VITAMIN D 25 HYDROXY: CPT

## 2025-03-11 PROCEDURE — 84153 ASSAY OF PSA TOTAL: CPT

## 2025-03-11 RX ORDER — ROSUVASTATIN CALCIUM 5 MG/1
5 TABLET, COATED ORAL DAILY
Qty: 90 TABLET | Refills: 1 | Status: SHIPPED | OUTPATIENT
Start: 2025-03-11

## 2025-03-11 NOTE — PROGRESS NOTES
"  Your [facility] Board-Certified Dermatologist's Name: Shalini Rendon MD    Skin Screening Exam:    A chaperone was present throughout the entire encounter.      SKIN:  FULL ORGAN SYSTEM EXAM   Hair, Scalp, Ears, Face Normal except as noted below in Assessment   Neck, Cervical Chain Nodes Normal except as noted below in Assessment   Right Arm/Hand/Fingers Normal except as noted below in Assessment   Left Arm/Hand/Fingers Normal except as noted below in Assessment   Chest/Breasts/Axillae Did the patient specifically refuse to have the areas \"under-the-bra\" examined by the Dermatologist? No  Examined areas normal except as noted below in Assessment   Abdomen, Umbilicus Normal except as noted below in Assessment   Back/Spine Normal except as noted below in Assessment   Groin/Genitalia/Buttocks Did the patient specifically refuse to have the areas \"under-the-underwear\" examined by the Dermatologist? No  Examined areas normal except as noted below in Assessment   Right Leg, Foot, Toes Normal except as noted below in Assessment   Left Leg, Foot, Toes Normal except as noted below in Assessment        Assessment and Plan by Diagnosis:    Use a moisturizer + sunscreen \"combo\" product such as Neutrogena Daily Defense SPF 50+ or CeraVe AM at least three times a day.  Follow-up with your private dermatologist or one of our board-certified [facility] Dermatologists as discussed.  [facility] Dermat[facility]'s own Dr. Beverly Rubio is available for consultation for skin enhancement and revitalization services; please mention \"[facility]\" for expedited scheduling  Full skin exam performed Recommend skin exam every 1-2 years   ACTINIC DAMAGE (Chronic Ultraviolet Radiation Exposure)  DERMATOFIBROMA L SHOULDER  MULTIPLE BENIGN NEVI  FLORES ANGIOMA  "

## 2025-03-11 NOTE — PROGRESS NOTES
HEART AND VASCULAR SUMMARY    1. Vital Signs:  Blood Pressure 126/84 mmHg , Pulse 77 bmp    2. Lipids: Total 222, , HDL 42, , hs C-RP 1.26    3. ECG: Sinus Bradycardia    4. Echocardiogram: Normal cardiac function.  Structurally normal heart.    5. Stress ECHO: Excellent exercise capacity.  No evidence of myocardial ischemia.  Hypertensive response to exercise.     6. Vascular testing: Carotids - < 50% stenosis of L ICA, Abdominal Aorta - normal    7. Coronary Calcium CT: 0    8. Cardiovascular Risk Score: 5.6% 10 year risk of heart disease or stroke.    Impression and Recommendations:    Mr. Whitt is a 54 year old man with dyslipidemia who returns for an [facility] physical. He is asymptomatic from a cardiac standpoint.  He denies any chest pain, shortness of breath, or palpitations. He exercises on a regular basis.      Excellent cardiovascular fitness.  Mild dyslipidemia - Elevated LDL cholesterol.  Patient with new plaquing in L ICA.  Would consider starting low-dose statin (Rosuvastatin 5 mg daily).  Hypertensive response to exercise - asymptomatic.  Would monitor resting blood pressure.

## 2025-03-11 NOTE — PROGRESS NOTES
[facility] Physical Exam     Donaldo Whitt is a 54 y.o. male who is presenting for his 7th [facility] Physical Exam at [facility]. Hubert is the  at [employer].  He and his family reside in [place].     Hubert's past medical history is significant for hyperlipidemia, elevated blood glucose, vitamin D deficiency, and microscopic hematuria.     He does not have any significant past surgical history.     Hubert's family history is positive for diabetes and hypertension (father side), and breast cancer (on mother side).     He is a non-smoker (he does have prior history of occasional cigar smoking).  He rarely drinks alcohol.  He drinks approximately 3-4 cups of coffee per day.  He considers his diet mostly healthy, trying to watch carbs and fats in diet.  He exercises 4 to 5 days a week (using an elliptical, treadmill, and Nautilus equipment).  Hubert is  with 3 children.    Review of Systems   Constitutional:  Negative for chills and fever.   HENT:  Negative for ear pain and sore throat.    Eyes:  Negative for pain and visual disturbance.   Respiratory:  Negative for cough and shortness of breath.    Cardiovascular:  Negative for chest pain and palpitations.   Gastrointestinal:  Negative for abdominal pain and vomiting.   Genitourinary:  Negative for dysuria and hematuria.   Musculoskeletal:  Negative for arthralgias and back pain.   Skin:  Negative for color change and rash.   Neurological:  Negative for seizures and syncope.   All other systems reviewed and are negative.        Active Ambulatory Problems     Diagnosis Date Noted   • Vitamin D deficiency 03/29/2018   • Mixed hyperlipidemia 03/29/2018   • Microscopic hematuria 03/29/2018   • Primary osteoarthritis of both knees 12/16/2021   • Prediabetes 02/29/2024   • Overweight (BMI 25.0-29.9) 03/11/2025   • Stenosis of left carotid artery 03/11/2025     Resolved Ambulatory  "Problems     Diagnosis Date Noted   • Elevated glucose 03/29/2018   • Thoracic aortic aneurysm without rupture 12/16/2021   • Plantar fasciitis 04/09/2022   • Pain in left foot 04/09/2022   • Well adult exam 12/19/2022   • Ascending aorta dilation (HCC) 12/20/2022   • Elevated blood pressure reading 12/20/2022     No Additional Past Medical History       Past Surgical History:   Procedure Laterality Date   • WISDOM TOOTH EXTRACTION         Family History   Problem Relation Age of Onset   • Breast cancer Mother    • Diabetes type II Father    • Hypertension Father        Social History     Tobacco Use   Smoking Status Former   • Types: Cigars   Smokeless Tobacco Never         Current Outpatient Medications:   •  Cholecalciferol (Vitamin D3) 250 MCG (74490 UT) TABS, Take 1 tablet (10,000 Units total) by mouth in the morning, Disp: , Rfl:     No Known Allergies      Objective:    Vitals:    03/11/25 1044   BP: 126/84   Pulse: 77   Resp: 14   SpO2: 100%   Weight: 92.1 kg (203 lb)   Height: 5' 11\" (1.803 m)        Physical Exam  Vitals and nursing note reviewed.   Constitutional:       General: He is not in acute distress.     Appearance: He is well-developed.   HENT:      Head: Normocephalic and atraumatic.   Eyes:      Conjunctiva/sclera: Conjunctivae normal.   Cardiovascular:      Rate and Rhythm: Normal rate and regular rhythm.      Heart sounds: No murmur heard.  Pulmonary:      Effort: Pulmonary effort is normal. No respiratory distress.      Breath sounds: Normal breath sounds.   Abdominal:      Palpations: Abdomen is soft.      Tenderness: There is no abdominal tenderness.   Genitourinary:     Comments: FRANCINE: declined/deferred  Musculoskeletal:         General: No swelling.      Cervical back: Neck supple.   Skin:     General: Skin is warm and dry.      Capillary Refill: Capillary refill takes less than 2 seconds.   Neurological:      Mental Status: He is alert.   Psychiatric:         Mood and Affect: Mood normal. "             Assessment/Plan:     Assessment & Plan  Prediabetes  You have a history of prediabetes.  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 Garcia started you on metformin 500 mg daily.  Unfortunately, you experienced gastrointestinal side effects, and discontinued med.  I would recommend continuing to work on reducing bad carbohydrates in your diet as well as a regular exercise program, and weight loss.       Mixed hyperlipidemia  Your cholesterol today was elevated at 222 (normal is less than 200).  Your cardiovascular risk score (ASCVD) is intermediate at 5.6%.  Our cardiologist has recommended that you start a low dose cholesterol med to help lower your cardiovascular risk (rosuvastatin 5 mg daily). I would also recommend continuing to work on heart healthy diet, regular exercise, and weight loss.       Overweight (BMI 25.0-29.9)  Your weight today was 203 pounds.  Your body mass index is 28.31.  This puts you in the overweight category.  I would recommend a 15 pound weight loss to help achieve optimal health.  Hopefully the information given to you today by our nutritionist and fitness expert will be helpful for you to achieve this goal.       Stenosis of left carotid artery  Your left carotid artery showed a small amount of plaque. This is new compared to your last scan. Our cardiologist has advised that you start a low dose statin (rosuvastatin 5 mg daily) to help prevent further plaque formation. I would also recommend continuing to work on heart healthy diet, regular exercise, and maintaining optimal weight.  I would recommend repeating ultrasound again in 1 to 2 years.       Microscopic hematuria  You have a history of microscopic blood in your urine.  Your evaluated by urologist last year.  They decided not to proceed with any further workup at that time because you had less than 3 red blood cells per high-powered field (seen under the microscope).  Today's urinalysis shows 2-4 red blood cells  per high-powered field.  I will send a message to your urologist to see if any further workup is warranted.       History of colon polyps  Your last colonoscopy was July 6, 2022.  This revealed 2  polyps.  You were advised to have your colonoscopy repeated in 2025.  I will place an order for you today.         Executive Physical Summary:     Overall, I think you are in pretty good health today.  Your skin exam today did not reveal any significant abnormalities.  I would recommend annual checkups with a dermatologist.  Your cardiology testing was negative (including EKG, stress test, and echocardiogram).     (Also please refer to individually listed diagnoses, cardiology, dermatology audiology, fitness, and nutrition reports for more information).    Your last colonoscopy was in July 6, 2022. Due to the presence of polyps, you were advised to have this repeated in 3 years.  I will place a referral order for you today for repeat colonoscopy    I reviewed your vaccination history.  You received the following vaccinations recently; this years flu shot, a tetanus booster, and Shingrix vaccination series.  You have declined the latest COVID booster.  I would recommend getting a pneumonia vaccination (Prevnar 20) in the near future.  This could be scheduled at your PCPs office.      Lastly, I would recommend continuing a healthy diet (including vitamin D 2000 international units daily and calcium 1200 mg daily).  I would also recommend continuing your regular exercise program (at least 150 minutes of aerobic exercise weekly).         Hubert,    Thank you for choosing [facility].  It was nice seeing you again today.  If you have any questions regarding today's exam, feel free to contact me. We hope to see you again in the future.     Murali Friedman ([facility] Lead Physician)  [phone]  [email or web address]

## 2025-03-11 NOTE — PROGRESS NOTES
Fitness Summary and Recommendations: Donaldo scored at the 45% on his body composition assessment with a bodyfat % of 23.5%. Donaldo scored in the average range for flexibility with a sit & reach score of 20 cm. His Muscle Strength/Endurance scores placed him at the 60% (lower body) and 95% (upper body) with a chair stand score of 25 and arm curl score of 32 respectively. Donaldo Cardiovascular Score of 60.3 placed him at the 95%. Overall, Donaldo would be considered to have an above average fitness level with an 68% score. His overall fitness score has decreased by 1% from his previous test on 02/29/24. Continued emphasis on regular exercise and sound nutrition practices will enable Donaldo to reach his optimal level of fitness.

## 2025-03-11 NOTE — PROGRESS NOTES
Hearing Assessment Summary:   Hearing Screening    250Hz 500Hz 1000Hz 2000Hz 4000Hz 8000Hz   Right ear 15 15 15 15 20 5   Left ear 15 10 10 10 25 5   Comments: HEARING EVALUATION    Name:  Donaldo Whitt  :  1970  Age:  54 y.o.   MRN:  [id number]  Date of Evaluation: 25     History: [employer] Exam  Reason for visit: Donaldo Whitt is being seen today for an evaluation of hearing as part of an [employer] examination.  patient reports no changes since his last evaluation. Previous testing revealed normal hearing, bilaterally.       EVALUATION:    Otoscopic Evaluation:   Right Ear: Clear and healthy ear canal and tympanic membrane   Left Ear: Clear and healthy ear canal and tympanic membrane    Tympanometry:   Right: Type A - normal middle ear pressure and compliance   Left: Type A - normal middle ear pressure and compliance    Audiogram Results:  Pure tone testing revealed normal hearing sensitivity bilaterally. SRT and PTA are in agreement indicating good test reliability. Word recognition scores were excellent bilaterally.       *see attached audiogram      RECOMMENDATIONS:  Return to [facility] for F/U    PATIENT EDUCATION:   Discussed results and recommendations with patient.  Questions were addressed and the patient was encouraged to contact our department should concerns arise.      Kong Moise., CCC-A  Clinical Audiologist'      Vision Screening    Right eye Left eye Both eyes   Without correction      With correction 20/20 20/25 20/15

## 2025-03-11 NOTE — PROGRESS NOTES
"Nutritional Summary and Recommendations:    Patient Nutrition-Oriented Medical/Diet Hx  Hubert presents today to [facility] for nutrition assessment and counseling. Since last visit pt has not had any significant changes in diet/health. Still notes vices being not eating frequently enough/skipping meals as well as larger portions when he does eat.  Observed to have adequate hydration, mainly water & coffee (adds 1/2 and 1/2).      Usually skips breakfast, during the day has some tail mix & fruit, sometimes vegetables. 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  Hubert cites lack of hunger/time for breakfast so feels a high protein/low carbohydrate shake would work. Reviewed labs. All questions answered.      Nutrition Related Medications/Supplements:  Vitamin D, 250 mcg daily     Labs:  A1C: 6.2  Total Cholesterol: 222 (H)  Triglycerides: 171 (H)  HDL: 42  LDL: 146 (H)  Vitamin D: 32.8      Anthropometrics:     Ht: 6'0\"   Wt: 201 lb    BMI: 27.3     BMR: 1888 kcal  Fat%: 23.5%  Acceptable Range: 11-22%     Fat Mass: 47.2 lb FFM: 153.8 lb  TBW: 112.6 lb    Nutrition Diagnosis:     Altered nutrition related laboratory values r/t physiological issues as evidenced by A1C 6.2 and cholesterol 222    Nutrition Recommendations:    Achieve LDL < 146 & A1c <6.2 by next time lab work is done  Consume a high protein, low carbohydrate (single digit g of carbs) shake for breakfast  Reach out to dietitian with any further questions or concerns    Nutrition Education    General healthy eating, snacking, meal prep, breakfast ideas      Perceived Comprehension:  Good     Expected Compliance: Good  "

## 2025-03-12 ENCOUNTER — RESULTS FOLLOW-UP (OUTPATIENT)
Dept: FAMILY MEDICINE CLINIC | Facility: CLINIC | Age: 55
End: 2025-03-12

## 2025-03-12 LAB — LPA SERPL-SCNC: 17.5 NMOL/L

## 2025-03-19 ENCOUNTER — TELEPHONE (OUTPATIENT)
Dept: FAMILY MEDICINE CLINIC | Facility: CLINIC | Age: 55
End: 2025-03-19

## 2025-03-19 DIAGNOSIS — R31.29 MICROSCOPIC HEMATURIA: Primary | ICD-10-CM

## 2025-03-25 ENCOUNTER — TELEPHONE (OUTPATIENT)
Age: 55
End: 2025-03-25

## 2025-03-25 ENCOUNTER — PREP FOR PROCEDURE (OUTPATIENT)
Age: 55
End: 2025-03-25

## 2025-03-25 DIAGNOSIS — Z86.0100 HISTORY OF COLON POLYPS: Primary | ICD-10-CM

## 2025-03-25 NOTE — TELEPHONE ENCOUNTER
Scheduled date of colonoscopy (as of today):7/22/25  Physician performing colonoscopy: Dr Corona   Location of colonoscopy:[facility]  Bowel prep reviewed with patient:reviewed chaz/dul prep instructions with pt, emailed chaz/dul prep instructions to patient's email   [email or web address]     Instructions reviewed with patient by:rene  Clearances: na

## 2025-03-25 NOTE — TELEPHONE ENCOUNTER
03/25/25  Screened by: Carri Dennis    Referring Provider Chloe    Pre- Screening:     There is no height or weight on file to calculate BMI.28.31  Has patient been referred for a routine screening Colonoscopy? yes  Is the patient between 45-75 years old? yes      Previous Colonoscopy yes   If yes:    Date: 2022    Facility:     Reason:           Does the patient want to see a Gastroenterologist prior to their procedure OR are they having any GI symptoms? no    Has the patient been hospitalized or had abdominal surgery in the past 6 months? yes    Does the patient use supplemental oxygen? no    Does the patient take Coumadin, Lovenox, Plavix, Elliquis, Xarelto, or other blood thinning medication? no    Has the patient had a stroke, cardiac event, or stent placed in the past year? no        If patient is between 45yrs - 49yrs, please advise patient that we will have to confirm benefits & coverage with their insurance company for a routine screening colonoscopy.

## 2025-07-08 ENCOUNTER — ANESTHESIA (OUTPATIENT)
Dept: ANESTHESIOLOGY | Facility: HOSPITAL | Age: 55
End: 2025-07-08

## 2025-07-08 ENCOUNTER — ANESTHESIA EVENT (OUTPATIENT)
Dept: ANESTHESIOLOGY | Facility: HOSPITAL | Age: 55
End: 2025-07-08

## 2025-07-22 ENCOUNTER — ANESTHESIA (OUTPATIENT)
Dept: GASTROENTEROLOGY | Facility: AMBULARY SURGERY CENTER | Age: 55
End: 2025-07-22
Payer: COMMERCIAL

## 2025-07-22 ENCOUNTER — HOSPITAL ENCOUNTER (OUTPATIENT)
Dept: GASTROENTEROLOGY | Facility: AMBULARY SURGERY CENTER | Age: 55
Setting detail: OUTPATIENT SURGERY
Discharge: HOME | End: 2025-07-22
Attending: INTERNAL MEDICINE
Payer: COMMERCIAL

## 2025-07-22 VITALS
DIASTOLIC BLOOD PRESSURE: 76 MMHG | RESPIRATION RATE: 16 BRPM | HEART RATE: 76 BPM | OXYGEN SATURATION: 99 % | SYSTOLIC BLOOD PRESSURE: 116 MMHG | TEMPERATURE: 98 F

## 2025-07-22 DIAGNOSIS — Z86.0100 HISTORY OF COLON POLYPS: ICD-10-CM

## 2025-07-22 PROCEDURE — G0105 COLORECTAL SCRN; HI RISK IND: HCPCS | Performed by: INTERNAL MEDICINE

## 2025-07-22 RX ORDER — PROPOFOL 10 MG/ML
INJECTION, EMULSION INTRAVENOUS AS NEEDED
Status: DISCONTINUED | OUTPATIENT
Start: 2025-07-22 | End: 2025-07-22

## 2025-07-22 RX ORDER — PROPOFOL 10 MG/ML
INJECTION, EMULSION INTRAVENOUS CONTINUOUS PRN
Status: DISCONTINUED | OUTPATIENT
Start: 2025-07-22 | End: 2025-07-22

## 2025-07-22 RX ORDER — SODIUM CHLORIDE, SODIUM LACTATE, POTASSIUM CHLORIDE, CALCIUM CHLORIDE 600; 310; 30; 20 MG/100ML; MG/100ML; MG/100ML; MG/100ML
INJECTION, SOLUTION INTRAVENOUS CONTINUOUS PRN
Status: DISCONTINUED | OUTPATIENT
Start: 2025-07-22 | End: 2025-07-22

## 2025-07-22 RX ADMIN — PROPOFOL 130 MCG/KG/MIN: 10 INJECTION, EMULSION INTRAVENOUS at 11:00

## 2025-07-22 RX ADMIN — SODIUM CHLORIDE, SODIUM LACTATE, POTASSIUM CHLORIDE, AND CALCIUM CHLORIDE: .6; .31; .03; .02 INJECTION, SOLUTION INTRAVENOUS at 09:25

## 2025-07-22 RX ADMIN — PROPOFOL 100 MG: 10 INJECTION, EMULSION INTRAVENOUS at 11:00

## 2025-07-22 NOTE — H&P
[facility]  History & Physical    Patient Info:  Name: Donaldo Whitt   Age: 54 y.o.   YOB: 1970   MRN: [id number]    HPI: Donaldo Whitt is a 54 y.o. year old male who presents for personal history of colon polyps    REVIEW OF SYSTEMS: Per the HPI, and otherwise unremarkable.    Historical Information   Past Medical History[1]  Past Surgical History[2]  Social History   Social History     Substance and Sexual Activity   Alcohol Use Yes    Alcohol/week: 2.0 standard drinks of alcohol    Types: 2 Cans of beer per week    Comment: Socially     Social History     Substance and Sexual Activity   Drug Use No     Tobacco Use History[3]  Family History[4]    MEDICATIONS & ALLERGIES:    Current Outpatient Medications   Medication Instructions    rosuvastatin (CRESTOR) 5 mg, Oral, Daily    Vitamin D3 10,000 Units, Oral, Daily     Allergies[5]    PHYSICAL EXAM:    Objective   Blood pressure 124/88, pulse 71, temperature (!) 97 °F (36.1 °C), temperature source Temporal, resp. rate 22, SpO2 96%. There is no height or weight on file to calculate BMI.    Gen: NAD  CV: RRR  CHEST: Clear  ABD: Soft, NT/ND  EXT: No edema    ASSESSMENT AND PLAN:  This is a 54 y.o. year old male here for colonoscopy, and he is stable and optimized for his procedure.      Trinidad Coy D.O.  Gastroenterology Fellow  [facility]  Division of Gastroenterology & Hepatology  Available on [employer]    ** Please Note: This note is constructed using a voice recognition dictation system. **        [1]   Past Medical History:  Diagnosis Date    Colon polyp     Hyperlipidemia    [2]   Past Surgical History:  Procedure Laterality Date    COLONOSCOPY      WISDOM TOOTH EXTRACTION     [3]   Social History  Tobacco Use   Smoking Status Former    Types: Cigars   Smokeless Tobacco Never   [4]   Family History  Problem Relation Name Age of Onset    Breast cancer Mother      Diabetes type II  Father      Hypertension Father     [5] No Known Allergies

## 2025-07-22 NOTE — ANESTHESIA PREPROCEDURE EVALUATION
Procedure:  COLONOSCOPY    Relevant Problems   CARDIO   (+) Mixed hyperlipidemia   (+) Stenosis of left carotid artery      MUSCULOSKELETAL   (+) Primary osteoarthritis of both knees        Physical Exam    Airway     Mallampati score: III  TM Distance: >3 FB  Neck ROM: full  Mouth opening: >= 4 cm      Cardiovascular  Cardiovascular exam normal    Dental   No notable dental hx     Pulmonary  Pulmonary exam normal     Neurological  - normal exam    Other Findings        Anesthesia Plan  ASA Score- 2     Anesthesia Type- IV sedation with anesthesia with ASA Monitors.         Additional Monitors:     Airway Plan: natural airway.           Plan Factors-Exercise tolerance (METS): >4 METS.    Chart reviewed.    Patient summary reviewed.    Patient is not a current smoker.              Induction- intravenous.    Postoperative Plan- .   Monitoring Plan - Monitoring plan - standard ASA monitoring          Informed Consent- Anesthetic plan and risks discussed with patient.        NPO Status:  Vitals Value Taken Time   Date of last liquid 07/22/25 07/22/25 09:22   Time of last liquid 0500 07/22/25 09:22   Date of last solid 07/20/25 07/22/25 09:22   Time of last solid 2200 07/22/25 09:22